# Patient Record
Sex: MALE | Race: WHITE | Employment: FULL TIME | URBAN - METROPOLITAN AREA
[De-identification: names, ages, dates, MRNs, and addresses within clinical notes are randomized per-mention and may not be internally consistent; named-entity substitution may affect disease eponyms.]

---

## 2018-09-27 ENCOUNTER — HOSPITAL ENCOUNTER (OUTPATIENT)
Dept: OCCUPATIONAL MEDICINE | Age: 38
Discharge: HOME | End: 2018-09-27

## 2018-09-27 DIAGNOSIS — R52 PAIN: ICD-10-CM

## 2019-08-30 ENCOUNTER — OFFICE VISIT (OUTPATIENT)
Dept: INTERNAL MEDICINE CLINIC | Age: 39
End: 2019-08-30
Payer: COMMERCIAL

## 2019-08-30 VITALS
OXYGEN SATURATION: 98 % | DIASTOLIC BLOOD PRESSURE: 80 MMHG | SYSTOLIC BLOOD PRESSURE: 110 MMHG | WEIGHT: 275 LBS | HEIGHT: 72 IN | BODY MASS INDEX: 37.25 KG/M2 | HEART RATE: 103 BPM

## 2019-08-30 DIAGNOSIS — R05.3 CHRONIC COUGH: ICD-10-CM

## 2019-08-30 DIAGNOSIS — Z11.59 SCREENING FOR VIRAL DISEASE: ICD-10-CM

## 2019-08-30 DIAGNOSIS — Z13.220 SCREENING CHOLESTEROL LEVEL: ICD-10-CM

## 2019-08-30 DIAGNOSIS — Z13.1 SCREENING FOR DIABETES MELLITUS: ICD-10-CM

## 2019-08-30 DIAGNOSIS — Z00.00 ANNUAL PHYSICAL EXAM: Primary | ICD-10-CM

## 2019-08-30 DIAGNOSIS — R31.21 ASYMPTOMATIC MICROSCOPIC HEMATURIA: ICD-10-CM

## 2019-08-30 PROCEDURE — 99386 PREV VISIT NEW AGE 40-64: CPT | Performed by: FAMILY MEDICINE

## 2019-08-30 SDOH — HEALTH STABILITY: MENTAL HEALTH: HOW OFTEN DO YOU HAVE A DRINK CONTAINING ALCOHOL?: NEVER

## 2019-08-30 ASSESSMENT — PATIENT HEALTH QUESTIONNAIRE - PHQ9
SUM OF ALL RESPONSES TO PHQ QUESTIONS 1-9: 0
SUM OF ALL RESPONSES TO PHQ9 QUESTIONS 1 & 2: 0
SUM OF ALL RESPONSES TO PHQ QUESTIONS 1-9: 0
2. FEELING DOWN, DEPRESSED OR HOPELESS: 0
1. LITTLE INTEREST OR PLEASURE IN DOING THINGS: 0

## 2019-09-02 ASSESSMENT — ENCOUNTER SYMPTOMS
DIARRHEA: 0
RHINORRHEA: 0
NAUSEA: 0
COUGH: 1
ABDOMINAL PAIN: 0
WHEEZING: 0
SHORTNESS OF BREATH: 0
BLOOD IN STOOL: 0
SORE THROAT: 0
CONSTIPATION: 0
BACK PAIN: 0
SINUS PRESSURE: 0
EYES NEGATIVE: 1

## 2019-10-01 DIAGNOSIS — R31.21 ASYMPTOMATIC MICROSCOPIC HEMATURIA: Primary | ICD-10-CM

## 2019-10-18 ENCOUNTER — TELEPHONE (OUTPATIENT)
Dept: INTERNAL MEDICINE CLINIC | Age: 39
End: 2019-10-18

## 2019-10-18 DIAGNOSIS — R31.21 ASYMPTOMATIC MICROSCOPIC HEMATURIA: Primary | ICD-10-CM

## 2019-11-06 ENCOUNTER — TELEPHONE (OUTPATIENT)
Dept: INTERNAL MEDICINE CLINIC | Age: 39
End: 2019-11-06

## 2019-11-06 DIAGNOSIS — R31.29 OTHER MICROSCOPIC HEMATURIA: Primary | ICD-10-CM

## 2019-12-30 ENCOUNTER — TELEPHONE (OUTPATIENT)
Dept: INTERNAL MEDICINE CLINIC | Age: 39
End: 2019-12-30

## 2019-12-30 ENCOUNTER — HOSPITAL ENCOUNTER (OUTPATIENT)
Age: 39
Setting detail: SPECIMEN
Discharge: HOME | End: 2019-12-30
Payer: COMMERCIAL

## 2019-12-30 LAB
BACTERIA: ABNORMAL /HPF
BILIRUBIN URINE: NEGATIVE MG/DL
BLOOD, URINE: ABNORMAL
CLARITY: CLEAR
COLOR: YELLOW
GLUCOSE, URINE: NEGATIVE MG/DL
KETONES, URINE: NEGATIVE MG/DL
LEUKOCYTE ESTERASE, URINE: NEGATIVE
NITRITE URINE, QUANTITATIVE: NEGATIVE
PH, URINE: 6 (ref 5–8)
PROTEIN UA: NEGATIVE MG/DL
RBC URINE: 1 /HPF (ref 0–3)
SPECIFIC GRAVITY UA: 1.02 (ref 1–1.03)
SQUAMOUS EPITHELIAL: <1 /HPF
TRICHOMONAS: ABNORMAL /HPF
UROBILINOGEN, URINE: NORMAL MG/DL (ref 0.2–1)
WBC UA: 1 /HPF (ref 0–2)

## 2019-12-30 PROCEDURE — 81001 URINALYSIS AUTO W/SCOPE: CPT

## 2019-12-31 DIAGNOSIS — R31.29 OTHER MICROSCOPIC HEMATURIA: Primary | ICD-10-CM

## 2020-12-03 ENCOUNTER — TELEPHONE (OUTPATIENT)
Dept: INTERNAL MEDICINE CLINIC | Age: 40
End: 2020-12-03

## 2020-12-03 NOTE — TELEPHONE ENCOUNTER
Pt calling in stating he had tested positive for Covid and was sent home yesterday for a temp and still has no sense of smell. Advised pt to go get evaluated at urgent care or the ER.

## 2021-05-25 ENCOUNTER — OFFICE VISIT (OUTPATIENT)
Dept: INTERNAL MEDICINE CLINIC | Age: 41
End: 2021-05-25
Payer: COMMERCIAL

## 2021-05-25 VITALS
BODY MASS INDEX: 33.6 KG/M2 | OXYGEN SATURATION: 93 % | SYSTOLIC BLOOD PRESSURE: 120 MMHG | HEIGHT: 71 IN | WEIGHT: 240 LBS | TEMPERATURE: 97.3 F | DIASTOLIC BLOOD PRESSURE: 78 MMHG | HEART RATE: 88 BPM

## 2021-05-25 DIAGNOSIS — Z86.16 HISTORY OF COVID-19: ICD-10-CM

## 2021-05-25 DIAGNOSIS — Z13.220 SCREENING CHOLESTEROL LEVEL: ICD-10-CM

## 2021-05-25 DIAGNOSIS — Z13.1 SCREENING FOR DIABETES MELLITUS: ICD-10-CM

## 2021-05-25 DIAGNOSIS — R05.3 CHRONIC COUGH: ICD-10-CM

## 2021-05-25 DIAGNOSIS — Z00.00 ENCOUNTER FOR ANNUAL PHYSICAL EXAM: Primary | ICD-10-CM

## 2021-05-25 PROCEDURE — 99396 PREV VISIT EST AGE 40-64: CPT | Performed by: FAMILY MEDICINE

## 2021-05-25 RX ORDER — ALBUTEROL SULFATE 90 UG/1
2 AEROSOL, METERED RESPIRATORY (INHALATION) EVERY 6 HOURS PRN
Qty: 1 INHALER | Refills: 0 | Status: SHIPPED | OUTPATIENT
Start: 2021-05-25

## 2021-05-25 RX ORDER — CETIRIZINE HYDROCHLORIDE 5 MG/1
5 TABLET ORAL DAILY
COMMUNITY
End: 2022-10-28

## 2021-05-25 SDOH — ECONOMIC STABILITY: FOOD INSECURITY: WITHIN THE PAST 12 MONTHS, THE FOOD YOU BOUGHT JUST DIDN'T LAST AND YOU DIDN'T HAVE MONEY TO GET MORE.: NEVER TRUE

## 2021-05-25 ASSESSMENT — ENCOUNTER SYMPTOMS
DIARRHEA: 0
CONSTIPATION: 0
BACK PAIN: 0
ABDOMINAL PAIN: 0
NAUSEA: 0
EYES NEGATIVE: 1
BLOOD IN STOOL: 0
SHORTNESS OF BREATH: 0

## 2021-05-25 ASSESSMENT — PATIENT HEALTH QUESTIONNAIRE - PHQ9
SUM OF ALL RESPONSES TO PHQ QUESTIONS 1-9: 0

## 2021-05-25 ASSESSMENT — SOCIAL DETERMINANTS OF HEALTH (SDOH): HOW HARD IS IT FOR YOU TO PAY FOR THE VERY BASICS LIKE FOOD, HOUSING, MEDICAL CARE, AND HEATING?: NOT HARD AT ALL

## 2021-05-25 NOTE — PROGRESS NOTES
Placed This Encounter   Medications    albuterol sulfate  (90 Base) MCG/ACT inhaler     Sig: Inhale 2 puffs into the lungs every 6 hours as needed for Wheezing     Dispense:  1 Inhaler     Refill:  0   Per orders  Start Albuterol. Stop Primatene Mist  ADR's explained  Persist RTO or call         Return for Follow up as needed or annually.     Electronically Signed by Villa King DO

## 2021-05-29 ASSESSMENT — ENCOUNTER SYMPTOMS: COUGH: 1

## 2021-06-26 ENCOUNTER — HOSPITAL ENCOUNTER (OUTPATIENT)
Age: 41
Discharge: HOME | End: 2021-06-26
Payer: COMMERCIAL

## 2021-06-26 ENCOUNTER — HOSPITAL ENCOUNTER (OUTPATIENT)
Dept: GENERAL RADIOLOGY | Age: 41
Discharge: HOME | End: 2021-06-26
Payer: COMMERCIAL

## 2021-06-26 DIAGNOSIS — Z13.1 SCREENING FOR DIABETES MELLITUS: ICD-10-CM

## 2021-06-26 DIAGNOSIS — Z00.00 ENCOUNTER FOR ANNUAL PHYSICAL EXAM: ICD-10-CM

## 2021-06-26 DIAGNOSIS — Z86.16 HISTORY OF COVID-19: ICD-10-CM

## 2021-06-26 DIAGNOSIS — Z13.220 SCREENING CHOLESTEROL LEVEL: ICD-10-CM

## 2021-06-26 DIAGNOSIS — R05.3 CHRONIC COUGH: ICD-10-CM

## 2021-06-26 LAB
ALBUMIN SERPL-MCNC: 3.3 GM/DL (ref 3.4–5)
ALP BLD-CCNC: 83 IU/L (ref 40–128)
ALT SERPL-CCNC: 17 U/L (ref 10–40)
ANION GAP SERPL CALCULATED.3IONS-SCNC: 7 MMOL/L (ref 4–16)
AST SERPL-CCNC: 17 IU/L (ref 15–37)
BACTERIA: NEGATIVE /HPF
BASOPHILS ABSOLUTE: 0.1 K/CU MM
BASOPHILS RELATIVE PERCENT: 1 % (ref 0–1)
BILIRUB SERPL-MCNC: 1.2 MG/DL (ref 0–1)
BILIRUBIN URINE: NEGATIVE MG/DL
BLOOD, URINE: ABNORMAL
BUN BLDV-MCNC: 14 MG/DL (ref 6–23)
CALCIUM SERPL-MCNC: 9.3 MG/DL (ref 8.3–10.6)
CHLORIDE BLD-SCNC: 100 MMOL/L (ref 99–110)
CHOLESTEROL: 159 MG/DL
CLARITY: CLEAR
CO2: 25 MMOL/L (ref 21–32)
COLOR: YELLOW
CREAT SERPL-MCNC: 0.8 MG/DL (ref 0.9–1.3)
DIFFERENTIAL TYPE: ABNORMAL
EOSINOPHILS ABSOLUTE: 0.3 K/CU MM
EOSINOPHILS RELATIVE PERCENT: 5.6 % (ref 0–3)
ESTIMATED AVERAGE GLUCOSE: 128 MG/DL
GFR AFRICAN AMERICAN: >60 ML/MIN/1.73M2
GFR NON-AFRICAN AMERICAN: >60 ML/MIN/1.73M2
GLUCOSE BLD-MCNC: 84 MG/DL (ref 70–99)
GLUCOSE, URINE: NEGATIVE MG/DL
HBA1C MFR BLD: 6.1 % (ref 4.2–6.3)
HCT VFR BLD CALC: 52 % (ref 42–52)
HDLC SERPL-MCNC: 36 MG/DL
HEMOGLOBIN: 17.3 GM/DL (ref 13.5–18)
IMMATURE NEUTROPHIL %: 0.2 % (ref 0–0.43)
KETONES, URINE: NEGATIVE MG/DL
LDL CHOLESTEROL DIRECT: 115 MG/DL
LEUKOCYTE ESTERASE, URINE: NEGATIVE
LYMPHOCYTES ABSOLUTE: 1.4 K/CU MM
LYMPHOCYTES RELATIVE PERCENT: 23.7 % (ref 24–44)
MCH RBC QN AUTO: 30.4 PG (ref 27–31)
MCHC RBC AUTO-ENTMCNC: 33.3 % (ref 32–36)
MCV RBC AUTO: 91.2 FL (ref 78–100)
MONOCYTES ABSOLUTE: 0.5 K/CU MM
MONOCYTES RELATIVE PERCENT: 9.1 % (ref 0–4)
NITRITE URINE, QUANTITATIVE: NEGATIVE
NUCLEATED RBC %: 0 %
PDW BLD-RTO: 12.8 % (ref 11.7–14.9)
PH, URINE: 6 (ref 5–8)
PLATELET # BLD: 311 K/CU MM (ref 140–440)
PMV BLD AUTO: 8.9 FL (ref 7.5–11.1)
POTASSIUM SERPL-SCNC: 4.4 MMOL/L (ref 3.5–5.1)
PROTEIN UA: NEGATIVE MG/DL
RBC # BLD: 5.7 M/CU MM (ref 4.6–6.2)
RBC URINE: <1 /HPF (ref 0–3)
SEGMENTED NEUTROPHILS ABSOLUTE COUNT: 3.5 K/CU MM
SEGMENTED NEUTROPHILS RELATIVE PERCENT: 60.4 % (ref 36–66)
SODIUM BLD-SCNC: 132 MMOL/L (ref 135–145)
SPECIFIC GRAVITY UA: 1.01 (ref 1–1.03)
SQUAMOUS EPITHELIAL: <1 /HPF
TOTAL IMMATURE NEUTOROPHIL: 0.01 K/CU MM
TOTAL NUCLEATED RBC: 0 K/CU MM
TOTAL PROTEIN: 10.2 GM/DL (ref 6.4–8.2)
TRICHOMONAS: ABNORMAL /HPF
TRIGL SERPL-MCNC: 75 MG/DL
UROBILINOGEN, URINE: NEGATIVE MG/DL (ref 0.2–1)
WBC # BLD: 5.7 K/CU MM (ref 4–10.5)
WBC UA: <1 /HPF (ref 0–2)

## 2021-06-26 PROCEDURE — 71046 X-RAY EXAM CHEST 2 VIEWS: CPT

## 2021-06-26 PROCEDURE — 80061 LIPID PANEL: CPT

## 2021-06-26 PROCEDURE — 85025 COMPLETE CBC W/AUTO DIFF WBC: CPT

## 2021-06-26 PROCEDURE — 36415 COLL VENOUS BLD VENIPUNCTURE: CPT

## 2021-06-26 PROCEDURE — 80053 COMPREHEN METABOLIC PANEL: CPT

## 2021-06-26 PROCEDURE — 83036 HEMOGLOBIN GLYCOSYLATED A1C: CPT

## 2021-06-26 PROCEDURE — 81001 URINALYSIS AUTO W/SCOPE: CPT

## 2021-06-26 PROCEDURE — 83721 ASSAY OF BLOOD LIPOPROTEIN: CPT

## 2021-06-29 ENCOUNTER — TELEPHONE (OUTPATIENT)
Dept: INTERNAL MEDICINE CLINIC | Age: 41
End: 2021-06-29

## 2021-06-29 DIAGNOSIS — R05.3 CHRONIC COUGH: ICD-10-CM

## 2021-06-29 DIAGNOSIS — R93.89 ABNORMAL CHEST X-RAY: Primary | ICD-10-CM

## 2021-07-01 ENCOUNTER — TELEPHONE (OUTPATIENT)
Dept: INTERNAL MEDICINE CLINIC | Age: 41
End: 2021-07-01

## 2021-07-01 DIAGNOSIS — E87.1 HYPONATREMIA: ICD-10-CM

## 2021-07-01 DIAGNOSIS — R17 ELEVATED BILIRUBIN: ICD-10-CM

## 2021-07-01 DIAGNOSIS — R77.9 ELEVATED BLOOD PROTEIN: Primary | ICD-10-CM

## 2021-07-01 NOTE — TELEPHONE ENCOUNTER
He can do the lab the same day he goes to the appt. They are ordered in the system. He probably can not see it. He already had the Creatinine done.  I ordered some additional labs to follow up on some of the recent abnormal labs

## 2021-07-01 NOTE — TELEPHONE ENCOUNTER
My chart message  I've got my CT scan scheduled. What do I need to do about the total serum protein test I'm told needs to be done before the scan? Serum creatnine?

## 2021-07-06 ENCOUNTER — HOSPITAL ENCOUNTER (OUTPATIENT)
Age: 41
Discharge: HOME | End: 2021-07-06
Payer: COMMERCIAL

## 2021-07-06 ENCOUNTER — HOSPITAL ENCOUNTER (OUTPATIENT)
Dept: CT IMAGING | Age: 41
Discharge: HOME | End: 2021-07-06
Payer: COMMERCIAL

## 2021-07-06 DIAGNOSIS — R05.3 CHRONIC COUGH: ICD-10-CM

## 2021-07-06 DIAGNOSIS — R93.89 ABNORMAL CHEST X-RAY: ICD-10-CM

## 2021-07-06 LAB
ANION GAP SERPL CALCULATED.3IONS-SCNC: 4 MMOL/L (ref 4–16)
BILIRUB SERPL-MCNC: 0.8 MG/DL (ref 0–1)
BILIRUBIN DIRECT: 0.2 MG/DL (ref 0–0.3)
CHLORIDE BLD-SCNC: 100 MMOL/L (ref 99–110)
CO2: 28 MMOL/L (ref 21–32)
POTASSIUM SERPL-SCNC: 4.4 MMOL/L (ref 3.5–5.1)
SODIUM BLD-SCNC: 132 MMOL/L (ref 135–145)

## 2021-07-06 PROCEDURE — 82247 BILIRUBIN TOTAL: CPT

## 2021-07-06 PROCEDURE — 86320 SERUM IMMUNOELECTROPHORESIS: CPT

## 2021-07-06 PROCEDURE — 36415 COLL VENOUS BLD VENIPUNCTURE: CPT

## 2021-07-06 PROCEDURE — 84165 PROTEIN E-PHORESIS SERUM: CPT

## 2021-07-06 PROCEDURE — 71250 CT THORAX DX C-: CPT

## 2021-07-06 PROCEDURE — 82248 BILIRUBIN DIRECT: CPT

## 2021-07-06 PROCEDURE — 80051 ELECTROLYTE PANEL: CPT

## 2021-07-07 RX ORDER — PREDNISONE 10 MG/1
TABLET ORAL
Qty: 18 TABLET | Refills: 0 | Status: SHIPPED | OUTPATIENT
Start: 2021-07-07 | End: 2021-07-21 | Stop reason: ALTCHOICE

## 2021-07-09 ENCOUNTER — PATIENT MESSAGE (OUTPATIENT)
Dept: INTERNAL MEDICINE CLINIC | Age: 41
End: 2021-07-09

## 2021-07-09 LAB
ALBUMIN ELP: 3.2 GM/DL (ref 3.2–5.6)
ALPHA-1-GLOBULIN: 0.2 GM/DL (ref 0.1–0.4)
ALPHA-2-GLOBULIN: 0.7 GM/DL (ref 0.4–1.2)
BETA GLOBULIN: 1 GM/DL (ref 0.5–1.3)
GAMMA GLOBULIN: 5.1 GM/DL (ref 0.5–1.6)
SPEP INTERPRETATION: ABNORMAL
SPEP INTERPRETATION: NORMAL
TOTAL PROTEIN: 10.2 GM/DL (ref 6.4–8.2)

## 2021-07-09 NOTE — TELEPHONE ENCOUNTER
From: Syeda Baeza  To: Mehul Torres DO  Sent: 7/9/2021 12:14 PM EDT  Subject: Non-Urgent Medical Question    I have an appointment scheduled with Dr Riley Toure 7/21/21 at 2:45.  He didn't need a referral.

## 2021-07-27 ENCOUNTER — HOSPITAL ENCOUNTER (OUTPATIENT)
Dept: CARDIAC REHAB | Age: 41
Discharge: HOME | End: 2021-07-27
Payer: COMMERCIAL

## 2021-07-27 LAB
SARS-COV-2, NAAT: NOT DETECTED
SOURCE: NORMAL

## 2021-07-27 PROCEDURE — 94727 GAS DIL/WSHOT DETER LNG VOL: CPT

## 2021-07-27 PROCEDURE — 94060 EVALUATION OF WHEEZING: CPT

## 2021-07-27 PROCEDURE — 94640 AIRWAY INHALATION TREATMENT: CPT

## 2021-07-27 PROCEDURE — 87635 SARS-COV-2 COVID-19 AMP PRB: CPT

## 2021-07-27 PROCEDURE — 94729 DIFFUSING CAPACITY: CPT

## 2021-08-27 ENCOUNTER — TELEPHONE (OUTPATIENT)
Dept: INTERNAL MEDICINE CLINIC | Age: 41
End: 2021-08-27

## 2021-08-31 NOTE — TELEPHONE ENCOUNTER
Spoke to pt last week after visits to pulmonology. He has lung nodules and hypersensitivity pneumonitis. He was referred to [facility]. Prednisone can help. He states his weight is stable.  He will follow up with [facility]

## 2022-05-27 ENCOUNTER — OFFICE VISIT (OUTPATIENT)
Dept: INTERNAL MEDICINE CLINIC | Age: 42
End: 2022-05-27
Payer: COMMERCIAL

## 2022-05-27 VITALS
OXYGEN SATURATION: 96 % | DIASTOLIC BLOOD PRESSURE: 74 MMHG | HEIGHT: 72 IN | WEIGHT: 250 LBS | BODY MASS INDEX: 33.86 KG/M2 | HEART RATE: 89 BPM | SYSTOLIC BLOOD PRESSURE: 120 MMHG

## 2022-05-27 DIAGNOSIS — J67.9 HYPERSENSITIVITY PNEUMONITIS (HCC): ICD-10-CM

## 2022-05-27 DIAGNOSIS — R73.03 PREDIABETES: ICD-10-CM

## 2022-05-27 DIAGNOSIS — E78.5 DYSLIPIDEMIA: ICD-10-CM

## 2022-05-27 DIAGNOSIS — R22.42 LUMP OF THIGH, LEFT: ICD-10-CM

## 2022-05-27 DIAGNOSIS — Z00.00 ANNUAL PHYSICAL EXAM: Primary | ICD-10-CM

## 2022-05-27 PROCEDURE — 99396 PREV VISIT EST AGE 40-64: CPT | Performed by: FAMILY MEDICINE

## 2022-05-27 SDOH — ECONOMIC STABILITY: FOOD INSECURITY: WITHIN THE PAST 12 MONTHS, YOU WORRIED THAT YOUR FOOD WOULD RUN OUT BEFORE YOU GOT MONEY TO BUY MORE.: NEVER TRUE

## 2022-05-27 SDOH — ECONOMIC STABILITY: FOOD INSECURITY: WITHIN THE PAST 12 MONTHS, THE FOOD YOU BOUGHT JUST DIDN'T LAST AND YOU DIDN'T HAVE MONEY TO GET MORE.: NEVER TRUE

## 2022-05-27 ASSESSMENT — PATIENT HEALTH QUESTIONNAIRE - PHQ9
SUM OF ALL RESPONSES TO PHQ QUESTIONS 1-9: 0
SUM OF ALL RESPONSES TO PHQ QUESTIONS 1-9: 0
1. LITTLE INTEREST OR PLEASURE IN DOING THINGS: 0
2. FEELING DOWN, DEPRESSED OR HOPELESS: 0
SUM OF ALL RESPONSES TO PHQ QUESTIONS 1-9: 0
SUM OF ALL RESPONSES TO PHQ9 QUESTIONS 1 & 2: 0
SUM OF ALL RESPONSES TO PHQ QUESTIONS 1-9: 0

## 2022-05-27 ASSESSMENT — ENCOUNTER SYMPTOMS
COUGH: 1
NAUSEA: 0
CONSTIPATION: 0
BACK PAIN: 0
SHORTNESS OF BREATH: 0
BLOOD IN STOOL: 0
ABDOMINAL PAIN: 0
DIARRHEA: 0

## 2022-05-27 ASSESSMENT — SOCIAL DETERMINANTS OF HEALTH (SDOH): HOW HARD IS IT FOR YOU TO PAY FOR THE VERY BASICS LIKE FOOD, HOUSING, MEDICAL CARE, AND HEATING?: NOT HARD AT ALL

## 2022-05-27 NOTE — PROGRESS NOTES
Well Adult Note  Name: Bryan Garg Date: 2022   MRN: [id number] Sex: Male   Age: 35 y.o. Ethnicity: Non- / Non    : 1980 Race: White (non-)      Prashant Ogden is here for well adult exam.  History:  Patient Active Problem List    Diagnosis Date Noted    Prediabetes 2022    Dyslipidemia 2022    Hypersensitivity pneumonia ([address] 2022     Pt was seen at [facility] and he had an evaluation for hypersensitivity pneumonitis with bronchoscopy. He will follow up for repeat CT of the chest. He can still occasionally cough   He c/o of a chronic lump to anterior lower L thigh. It can appear bruised over the surface. He has noticed this for a long time  +Wt gain      Review of Systems   Constitutional: Negative for chills, diaphoresis and fever. HENT: Negative. Eyes: Negative for visual disturbance. Respiratory: Positive for cough (occasional). Negative for shortness of breath. Cardiovascular: Negative for chest pain and palpitations. Varicose veins   Gastrointestinal: Negative for abdominal pain, blood in stool, constipation, diarrhea and nausea. Genitourinary: Negative for difficulty urinating and dysuria. Musculoskeletal: Negative for back pain. Neurological: Negative for dizziness, light-headedness and headaches. Psychiatric/Behavioral: Negative for dysphoric mood. The 10-year ASCVD risk score (Elli Melara, et al., 2013) is: 1.5%    Values used to calculate the score:      Age: 35 years      Sex: Male      Is Non- : No      Diabetic: No      Tobacco smoker: No      Systolic Blood Pressure: 428 mmHg      Is BP treated: No      HDL Cholesterol: 36 MG/DL      Total Cholesterol: 159 MG/DL    Allergies   Allergen Reactions    Latex     Penicillins          Prior to Visit Medications    Medication Sig Taking?  Authorizing Provider   cetirizine (ZYRTEC) 5 MG tablet Take 5 mg by mouth daily Yes Historical Provider, MD albuterol sulfate  (90 Base) MCG/ACT inhaler Inhale 2 puffs into the lungs every 6 hours as needed for Wheezing Yes Dot Lands, DO         Past Medical History:   Diagnosis Date    GERD (gastroesophageal reflux disease)     History of concussion     Teenager       Past Surgical History:   Procedure Laterality Date    WISDOM TOOTH EXTRACTION           Family History   Problem Relation Age of Onset    Seizures Mother    Vinayak Jauregui Rheum Arthritis Mother     Coronary Art Dis Father     Diabetes Father     COPD Father        Social History     Tobacco Use    Smoking status: Never Smoker    Smokeless tobacco: Never Used   Substance Use Topics    Alcohol use: Never    Drug use: Never       Objective   /74 (Site: Right Upper Arm, Position: Sitting, Cuff Size: Medium Adult)   Pulse 89   Ht 6' (1.829 m)   Wt 250 lb (113.4 kg)   SpO2 96%   BMI 33.91 kg/m²   Wt Readings from Last 3 Encounters:   05/27/22 250 lb (113.4 kg)   08/04/21 235 lb (106.6 kg)   07/21/21 235 lb (106.6 kg)     There were no vitals filed for this visit. Physical Exam  Vitals reviewed. Constitutional:       General: He is not in acute distress. HENT:      Right Ear: Tympanic membrane normal.      Left Ear: Tympanic membrane normal.   Eyes:      General: No scleral icterus. Extraocular Movements: Extraocular movements intact. Cardiovascular:      Rate and Rhythm: Normal rate and regular rhythm. Pulmonary:      Effort: Pulmonary effort is normal. No respiratory distress. Breath sounds: Normal breath sounds. Abdominal:      Palpations: Abdomen is soft. Tenderness: There is no abdominal tenderness. Musculoskeletal:      Cervical back: Neck supple. Right lower leg: No edema. Left lower leg: No edema. Skin:     Findings: No rash. Comments: L lower thigh with lump and bruising   Neurological:      Mental Status: He is alert and oriented to person, place, and time. Mental status is at baseline. Psychiatric:         Mood and Affect: Mood normal.           Assessment   Plan   1. Annual physical exam  -     CBC with Auto Differential; Future  -     Comprehensive Metabolic Panel; Future  -     Lipid Panel; Future  -     Hemoglobin A1C; Future  2. Prediabetes  -     Hemoglobin A1C; Future  The patient is asked to make an attempt to improve diet and exercise patterns  3. Dyslipidemia  -     Lipid Panel; Future  -     TSH with Reflex to FT4; Future  4. Hypersensitivity pneumonitis ([address]- improved  Patient will follow up with [facility]  5. Lump of thigh, left- chronic  -     US SOFT TISSUE LIMITED AREA; Future       Personalized Preventive Plan   Current Health Maintenance Status    There is no immunization history on file for this patient.      Health Maintenance   Topic Date Due    COVID-19 Vaccine (1) Never done    A1C test (Diabetic or Prediabetic)  06/26/2022    DTaP/Tdap/Td vaccine (1 - Tdap) 05/27/2023 (Originally 11/20/1997)    Hepatitis C screen  05/27/2023 (Originally 11/20/1996)    HIV screen  05/27/2023 (Originally 11/20/1993)    Flu vaccine (Season Ended) 09/01/2022    Depression Screen  05/27/2023    Lipids  06/26/2026    Hepatitis A vaccine  Aged Out    Hepatitis B vaccine  Aged Out    Hib vaccine  Aged Out    Meningococcal (ACWY) vaccine  Aged Out    Pneumococcal 0-64 years Vaccine  Aged Out     Recommendations for [facility] Due: see orders and patient instructions/AVS.  Persist RTO or call    Return in about 1 year (around 5/27/2023) for annual exam.

## 2022-06-14 ENCOUNTER — HOSPITAL ENCOUNTER (OUTPATIENT)
Dept: ULTRASOUND IMAGING | Age: 42
Discharge: HOME | End: 2022-06-14
Payer: COMMERCIAL

## 2022-06-14 DIAGNOSIS — R22.42 LUMP OF THIGH, LEFT: ICD-10-CM

## 2022-06-14 DIAGNOSIS — R22.42 LUMP OF LEFT THIGH: ICD-10-CM

## 2022-06-14 PROCEDURE — 76882 US LMTD JT/FCL EVL NVASC XTR: CPT

## 2022-06-15 ENCOUNTER — TELEPHONE (OUTPATIENT)
Dept: INTERNAL MEDICINE CLINIC | Age: 42
End: 2022-06-15

## 2022-06-15 DIAGNOSIS — R22.42 MASS OF LEFT THIGH: Primary | ICD-10-CM

## 2022-06-15 NOTE — TELEPHONE ENCOUNTER
Ashley Colindres from [facility] called regarding nonvascular ultrasound of the left extremity regarding the impression please review ASAP
Spoke to patient.  MRI ordered
Tdap , 2016/3/9 00:30 , Roxie Goncalves (RN)  Tdap , 2019/4/18 09:55 , Rosalina Conti (RN)

## 2022-06-16 ENCOUNTER — TELEPHONE (OUTPATIENT)
Dept: INTERNAL MEDICINE CLINIC | Age: 42
End: 2022-06-16

## 2022-06-16 DIAGNOSIS — Z01.818 PREPROCEDURAL EXAMINATION: Primary | ICD-10-CM

## 2022-06-16 NOTE — TELEPHONE ENCOUNTER
----- Message from [facility] sent at 6/16/2022  2:50 PM EDT -----  Subject: Referral Request    QUESTIONS   Reason for referral request? Vickey Mason from central scheduling called and is   asking for pcp to put in order for an orbital x-ray. He will need it done   before MRI on 7/1. Has the physician seen you for this condition before? No   Preferred Specialist (if applicable)? Do you already have an appointment scheduled? Yes  Select Scheduled Date? 2022-07-01  Select Scheduled Physician? Additional Information for Provider?   ---------------------------------------------------------------------------  --------------  CALL BACK INFO  What is the best way for the office to contact you? OK to leave message on   voicemail  Preferred Call Back Phone Number? [phone]  ---------------------------------------------------------------------------  --------------  SCRIPT ANSWERS  Relationship to Patient? Third Party  Third Party Type? Hospital?   Representative Name?  Vickey Mason

## 2022-06-16 NOTE — TELEPHONE ENCOUNTER
Pt needs an orbital xray order to be done prior to his 7/1/22 MRI per [facility].

## 2022-06-17 NOTE — TELEPHONE ENCOUNTER
Called [facility] and spoke with Cheyanne Diehl and informed her that the Orbital xray has been ordered.

## 2022-06-29 ENCOUNTER — HOSPITAL ENCOUNTER (OUTPATIENT)
Dept: GENERAL RADIOLOGY | Age: 42
Discharge: HOME | End: 2022-06-29
Payer: COMMERCIAL

## 2022-06-29 ENCOUNTER — HOSPITAL ENCOUNTER (OUTPATIENT)
Age: 42
Discharge: HOME | End: 2022-06-29
Payer: COMMERCIAL

## 2022-06-29 DIAGNOSIS — Z01.818 PREPROCEDURAL EXAMINATION: ICD-10-CM

## 2022-06-29 PROCEDURE — 70200 X-RAY EXAM OF EYE SOCKETS: CPT

## 2022-07-01 ENCOUNTER — HOSPITAL ENCOUNTER (OUTPATIENT)
Dept: MRI IMAGING | Age: 42
Discharge: HOME | End: 2022-07-01
Payer: COMMERCIAL

## 2022-07-01 DIAGNOSIS — R22.42 MASS OF LEFT THIGH: ICD-10-CM

## 2022-07-01 PROCEDURE — 73718 MRI LOWER EXTREMITY W/O DYE: CPT

## 2022-07-02 LAB
ALBUMIN SERPL-MCNC: 3.9 G/DL
ALP BLD-CCNC: 82 U/L
ALT SERPL-CCNC: 33 U/L
ANION GAP SERPL CALCULATED.3IONS-SCNC: 0.7 MMOL/L
AST SERPL-CCNC: 21 U/L
AVERAGE GLUCOSE: NORMAL
BASOPHILS ABSOLUTE: 0.1 /ΜL
BASOPHILS RELATIVE PERCENT: 0.8 %
BILIRUB SERPL-MCNC: 1 MG/DL (ref 0.1–1.4)
BUN BLDV-MCNC: 18 MG/DL
CALCIUM SERPL-MCNC: 9.6 MG/DL
CHLORIDE BLD-SCNC: 101 MMOL/L
CHOLESTEROL, TOTAL: 161 MG/DL
CHOLESTEROL/HDL RATIO: NORMAL
CO2: 27 MMOL/L
CREAT SERPL-MCNC: 0.9 MG/DL
EOSINOPHILS ABSOLUTE: 0.5 /ΜL
EOSINOPHILS RELATIVE PERCENT: 7.5 %
GFR CALCULATED: 109
GLUCOSE BLD-MCNC: 87 MG/DL
HBA1C MFR BLD: 5.4 %
HCT VFR BLD CALC: 47.6 % (ref 41–53)
HDLC SERPL-MCNC: 36 MG/DL (ref 35–70)
HEMOGLOBIN: 16.4 G/DL (ref 13.5–17.5)
LDL CHOLESTEROL CALCULATED: 100 MG/DL (ref 0–160)
LYMPHOCYTES ABSOLUTE: 1.7 /ΜL
LYMPHOCYTES RELATIVE PERCENT: 29 %
MCH RBC QN AUTO: 31.1 PG
MCHC RBC AUTO-ENTMCNC: 34.5 G/DL
MCV RBC AUTO: 90.3 FL
MONOCYTES ABSOLUTE: 0.6 /ΜL
MONOCYTES RELATIVE PERCENT: 9.5 %
NEUTROPHILS ABSOLUTE: 3.2 /ΜL
NEUTROPHILS RELATIVE PERCENT: 53.2 %
NONHDLC SERPL-MCNC: NORMAL MG/DL
PDW BLD-RTO: 12.3 %
PLATELET # BLD: 260 K/ΜL
PMV BLD AUTO: 9.6 FL
POTASSIUM SERPL-SCNC: 4.4 MMOL/L
RBC # BLD: 5.27 10^6/ΜL
SODIUM BLD-SCNC: 133 MMOL/L
TOTAL PROTEIN: 9.3
TRIGL SERPL-MCNC: 127 MG/DL
TSH SERPL DL<=0.05 MIU/L-ACNC: 2.03 UIU/ML
VLDLC SERPL CALC-MCNC: 25 MG/DL
WBC # BLD: 6 10^3/ML

## 2022-07-18 ENCOUNTER — TELEPHONE (OUTPATIENT)
Dept: INTERNAL MEDICINE CLINIC | Age: 42
End: 2022-07-18

## 2022-07-18 DIAGNOSIS — R77.9 ELEVATED BLOOD PROTEIN: ICD-10-CM

## 2022-07-18 DIAGNOSIS — J67.9 HYPERSENSITIVITY PNEUMONITIS (HCC): ICD-10-CM

## 2022-07-18 DIAGNOSIS — Z11.59 NEED FOR HEPATITIS C SCREENING TEST: ICD-10-CM

## 2022-07-18 DIAGNOSIS — Z11.4 SCREENING FOR HIV (HUMAN IMMUNODEFICIENCY VIRUS): Primary | ICD-10-CM

## 2022-08-27 LAB
ANTIBODY: NORMAL
C-REACTIVE PROTEIN: 0.4
HIV AG/AB: NORMAL
IGA: 310
IGE: 77
IGG: 4252
IGM: 225

## 2022-10-04 ENCOUNTER — TELEPHONE (OUTPATIENT)
Dept: INTERNAL MEDICINE CLINIC | Age: 42
End: 2022-10-04

## 2022-10-04 DIAGNOSIS — R76.8 INCREASED IMMUNOGLOBULIN: Primary | ICD-10-CM

## 2022-10-05 NOTE — TELEPHONE ENCOUNTER
I was able to reach patient to discuss labs and his symptoms.  Referred to hematology for additional evaluation

## 2022-10-07 DIAGNOSIS — Z11.59 NEED FOR HEPATITIS C SCREENING TEST: ICD-10-CM

## 2022-10-07 DIAGNOSIS — R77.9 ELEVATED BLOOD PROTEIN: ICD-10-CM

## 2022-10-07 DIAGNOSIS — Z11.4 SCREENING FOR HIV (HUMAN IMMUNODEFICIENCY VIRUS): ICD-10-CM

## 2022-10-07 DIAGNOSIS — J67.9 HYPERSENSITIVITY PNEUMONITIS (HCC): ICD-10-CM

## 2022-10-10 NOTE — PROGRESS NOTES
Patient Name:  Saskia Edmonds  Patient :  1980  Patient MRN:  [id number]     Primary Oncologist: Chirag Theodore MD  Referring Provider: Maci Bonds DO     Date of Service: 10/28/2022      Reason for Consult:  increased immunoglobulin      Chief Complaint:    Chief Complaint   Patient presents with    New Patient        Patient Active Problem List:     Prediabetes     Dyslipidemia     Hypersensitivity pneumonia ([address]     HPI:   Saskia Edmonds is a pleasant 35year old male patient who was referred for evaluation of increase immunoglobulin. He saw pulmonologist at [facility] for post COVID syndrome and  nonspecific hypersensitivity pneumonitis 2021. He was treated with prednisone for 3 - 4 weeks with improvement of the symptoms. He has BAL for RML in 2021. He had COVID in . FINAL DIAGNOSIS:  ? No Malignant Cells Are Identified  ? Alveolar Macrophages  ? Acute Inflammation, Few Eosinophils    CT chest at [facility] 2021:   The groundglass attenuation seen previously has improved. The mosaic pattern of attenuation, most prominent in the lung bases, felt to represent air trapping when correlated the prior study that included expiratory imaging, remains. Certainly subacute hypersensitivity pneumonitis could have this appearance, and imaging suggests some interval improvement. 2021 SPEP: no monoclonal bands. Increased polyclonal gamma globulins. 2022 CMP, TSH and CBC unremarkable. CRP 2022 0.4. HIV negative. IgA 310. IgE 77, IgG 4252, IgM 225. Hep C Ab screening negative. I will order CBC, CMP, LDH, SPEP/CHELSY, serum light chain study, JEFF, RF and US of abdomen.   If these remain inconclusive I will order labs as below,   JEFF, complement C3, complement C4, and ARI  Hepatitis B virus, hepatitis C virus, Will-Barr virus, human herpesvirus-8 serologies  Imaging for liver disease, lymphadenopathy, and malignancy  Bone marrow biopsy if lymphoma or hematological disorder suspected  lgG subclasses  Cytokine profile (IL-5, IL-6, and soluble IL-2 receptor)  Genetic testing for immunodeficiency syndromes    Past Medical History:   Diagnosis Date    GERD (gastroesophageal reflux disease)     History of concussion     Teenager     Past Surgical History:   Procedure Laterality Date    WISDOM TOOTH EXTRACTION       Social History:   No smoking, No EtOH. He has no biological children. Family History:   Paternal GM has lung cancer. Maternal GM has metastatic breast cancer. Allergies   Allergen Reactions    Latex     Penicillins      Current Outpatient Medications on File Prior to Visit   Medication Sig Dispense Refill    Multiple Vitamin (MULTIVITAMIN) capsule Take 1 capsule by mouth daily      loratadine (CLARITIN) 10 MG tablet Take 10 mg by mouth daily      albuterol sulfate  (90 Base) MCG/ACT inhaler Inhale 2 puffs into the lungs every 6 hours as needed for Wheezing 1 Inhaler 0     No current facility-administered medications on file prior to visit. Review of Systems:    Constitutional:  No weight loss, No fever, No chills, No night sweats. Energy level good. Eyes:  No diplopia, No transient or permanent loss of vision, No scotomata. ENT / Mouth:  No epistaxis, No dysphagia, No hoarseness, No oral ulcers, No gingival bleeding. No sore throat, No postnasal drip, No nasal drip, No mouth pain, No sinus pain, No tinnitus, Normal hearing. Cardiovascular:  No chest pain, No palpitations, No syncope, No upper extremity edema, No lower extremity edema, No calf discomfort. Respiratory:  No cough. No hemoptysis, No pleurisy, No wheezing, No dyspnea. Gastrointestinal:  No abdominal pain, No abdominal cramping, No nausea, No vomiting, No constipation, No diarrhea, No hematochezia, No melena, No jaundice, No dyspepsia, No dysphagia. Urinary:  No dysuria, No hematuria, No urinary incontinence.   Musculoskeletal:  No muscle pain, No swollen joints, No joint redness, No bone pain, No spine tenderness. Skin:  No rash, No nodules, No pruritus, No lesions. Neurologic:  No confusion, No seizures, No syncope, No tremor, No speech change, No headache, No hiccups, No abnormal gait, No sensory changes, No weakness. Psychiatric:  No depression, No anxiety, Concentration normal.  Endocrine:  No polyuria, No polydipsia, No hot flashes, No thyroid symptoms. Hematologic:  No epistaxis, No gingival bleeding, No petechiae, No ecchymosis. Lymphatic:  No lymphadenopathy, No lymphedema. Allergy / Immunologic:  No eczema, No frequent mucous infections, No frequent respiratory infections, No recurrent urticarial, No frequent skin infections. Vital Signs: /78 (Site: Right Upper Arm, Position: Sitting, Cuff Size: Large Adult)   Pulse 96   Temp 98.3 °F (36.8 °C) (Temporal)   Resp 18   Ht 5' 10\" (1.778 m)   Wt 260 lb 3.2 oz (118 kg)   SpO2 92%   BMI 37.33 kg/m²      Physical Exam:  CONSTITUTIONAL: awake, alert, cooperative, no apparent distress   EYES: pupils equal, round and reactive to light, sclera clear and conjunctiva normal  ENT: Normocephalic, without obvious abnormality, atraumatic  NECK: supple, symmetrical, no jugular venous distension and no carotid bruits   HEMATOLOGIC/LYMPHATIC: no cervical, supraclavicular or axillary lymphadenopathy   LUNGS: no increased work of breathing and clear to auscultation   CARDIOVASCULAR: regular rate and rhythm, normal S1 and S2, no murmur noted  ABDOMEN: normal bowel sounds x 4, soft, non-distended, non-tender, no masses palpated, no hepatosplenomegaly   MUSCULOSKELETAL: full range of motion noted, tone is normal  NEUROLOGIC: awake, alert, oriented to name, place and time. Motor skills grossly intact. SKIN: Normal skin color, texture, turgor and no jaundice.  appears intact   EXTREMITIES: no LE edema      Labs:  Hematology:  Lab Results   Component Value Date    WBC 9.1 10/28/2022    RBC 5.07 10/28/2022    HGB 15.9 10/28/2022    HCT 46.5 10/28/2022    MCV 91.7 10/28/2022    MCH 31.4 (H) 10/28/2022    MCHC 34.2 10/28/2022    RDW 12.7 10/28/2022     10/28/2022    MPV 8.6 10/28/2022    SEGSPCT 69.2 (H) 10/28/2022    EOSRELPCT 5.7 (H) 10/28/2022    BASOPCT 0.5 10/28/2022    LYMPHOPCT 17.3 (L) 10/28/2022    MONOPCT 7.3 (H) 10/28/2022    SEGSABS 6.3 10/28/2022    EOSABS 0.5 10/28/2022    BASOSABS 0.1 10/28/2022    LYMPHSABS 1.6 10/28/2022    MONOSABS 0.7 10/28/2022    DIFFTYPE AUTOMATED DIFFERENTIAL 10/28/2022     Lab Results   Component Value Date    ESR 58 (H) 10/28/2022     Chemistry:  Lab Results   Component Value Date     (L) 10/28/2022    K 4.6 10/28/2022    CL 98 (L) 10/28/2022    CO2 25 10/28/2022    BUN 20 10/28/2022    CREATININE 0.9 10/28/2022    GLUCOSE 84 10/28/2022    CALCIUM 9.4 10/28/2022    PROT 9.4 (H) 10/28/2022    PROT 9.4 (H) 10/28/2022    LABALBU 3.8 10/28/2022    BILITOT 0.6 10/28/2022    ALKPHOS 85 10/28/2022    AST 20 10/28/2022    ALT 23 10/28/2022    LABGLOM >60 10/28/2022    GFRAA >60 06/26/2021     Immunology:  Lab Results   Component Value Date    PROT 9.4 (H) 10/28/2022    PROT 9.4 (H) 10/28/2022    SPEP  07/06/2021     INTERPRETATION - Increased polyclonal gamma globulins. No monoclonal proteins are seen. SAF    SPEP INTERPRETATION - No monoclonal bands are seen. SAF 07/06/2021    ALBUMINELP 3.2 07/06/2021    LABALPH 0.2 07/06/2021    LABALPH 0.7 07/06/2021    LABBETA 1.0 07/06/2021    GAMGLOB 5.1 (H) 07/06/2021      Observations:  No data recorded     Assessment & Plan:  1. He was referred for evaluation of increased IgG. Currently asymptomatic. I ordered w/u as above. He has history of hypersensitivity pneumonitis, responding to steroid. I recommend to monitor for lymphadenopathy. 2. I recommend to keep colon cancer screening up to date. RTC in 3 - 4 weeks or sooner. All of the questions have been answered for today.     I have discussed the above stated plan with the patient and they verbalized understanding and agreed

## 2022-10-28 ENCOUNTER — INITIAL CONSULT (OUTPATIENT)
Dept: ONCOLOGY | Age: 42
End: 2022-10-28
Payer: COMMERCIAL

## 2022-10-28 ENCOUNTER — HOSPITAL ENCOUNTER (OUTPATIENT)
Dept: INFUSION THERAPY | Age: 42
Discharge: HOME | End: 2022-10-28
Payer: COMMERCIAL

## 2022-10-28 VITALS
HEIGHT: 70 IN | BODY MASS INDEX: 37.25 KG/M2 | HEART RATE: 96 BPM | OXYGEN SATURATION: 92 % | TEMPERATURE: 98.3 F | RESPIRATION RATE: 18 BRPM | DIASTOLIC BLOOD PRESSURE: 78 MMHG | WEIGHT: 260.2 LBS | SYSTOLIC BLOOD PRESSURE: 123 MMHG

## 2022-10-28 DIAGNOSIS — D89.2 HYPERGAMMAGLOBULINEMIA, UNSPECIFIED: ICD-10-CM

## 2022-10-28 DIAGNOSIS — D89.2 HYPERGAMMAGLOBULINEMIA, UNSPECIFIED: Primary | ICD-10-CM

## 2022-10-28 LAB
ALBUMIN SERPL-MCNC: 3.8 GM/DL (ref 3.4–5)
ALP BLD-CCNC: 85 IU/L (ref 40–129)
ALT SERPL-CCNC: 23 U/L (ref 10–40)
ANION GAP SERPL CALCULATED.3IONS-SCNC: 8 MMOL/L (ref 4–16)
AST SERPL-CCNC: 20 IU/L (ref 15–37)
BASOPHILS ABSOLUTE: 0.1 K/CU MM
BASOPHILS RELATIVE PERCENT: 0.5 % (ref 0–1)
BILIRUB SERPL-MCNC: 0.6 MG/DL (ref 0–1)
BUN BLDV-MCNC: 20 MG/DL (ref 6–23)
CALCIUM SERPL-MCNC: 9.4 MG/DL (ref 8.3–10.6)
CHLORIDE BLD-SCNC: 98 MMOL/L (ref 99–110)
CO2: 25 MMOL/L (ref 21–32)
CREAT SERPL-MCNC: 0.9 MG/DL (ref 0.9–1.3)
DIFFERENTIAL TYPE: ABNORMAL
EOSINOPHILS ABSOLUTE: 0.5 K/CU MM
EOSINOPHILS RELATIVE PERCENT: 5.7 % (ref 0–3)
ERYTHROCYTE SEDIMENTATION RATE: 58 MM/HR (ref 0–15)
GFR SERPL CREATININE-BSD FRML MDRD: >60 ML/MIN/1.73M2
GLUCOSE BLD-MCNC: 84 MG/DL (ref 70–99)
HCT VFR BLD CALC: 46.5 % (ref 42–52)
HEMOGLOBIN: 15.9 GM/DL (ref 13.5–18)
IGA: 298 MG/DL (ref 69–382)
IGG,SERUM: 4143 MG/DL (ref 723–1685)
IGM,SERUM: 248 MG/DL (ref 62–277)
LACTATE DEHYDROGENASE: 213 IU/L (ref 120–246)
LYMPHOCYTES ABSOLUTE: 1.6 K/CU MM
LYMPHOCYTES RELATIVE PERCENT: 17.3 % (ref 24–44)
MCH RBC QN AUTO: 31.4 PG (ref 27–31)
MCHC RBC AUTO-ENTMCNC: 34.2 % (ref 32–36)
MCV RBC AUTO: 91.7 FL (ref 78–100)
MONOCYTES ABSOLUTE: 0.7 K/CU MM
MONOCYTES RELATIVE PERCENT: 7.3 % (ref 0–4)
PDW BLD-RTO: 12.7 % (ref 11.7–14.9)
PLATELET # BLD: 285 K/CU MM (ref 140–440)
PMV BLD AUTO: 8.6 FL (ref 7.5–11.1)
POTASSIUM SERPL-SCNC: 4.6 MMOL/L (ref 3.5–5.1)
RBC # BLD: 5.07 M/CU MM (ref 4.6–6.2)
SEGMENTED NEUTROPHILS ABSOLUTE COUNT: 6.3 K/CU MM
SEGMENTED NEUTROPHILS RELATIVE PERCENT: 69.2 % (ref 36–66)
SODIUM BLD-SCNC: 131 MMOL/L (ref 135–145)
TOTAL PROTEIN: 9.4 GM/DL (ref 6.4–8.2)
WBC # BLD: 9.1 K/CU MM (ref 4–10.5)

## 2022-10-28 PROCEDURE — 99211 OFF/OP EST MAY X REQ PHY/QHP: CPT

## 2022-10-28 PROCEDURE — 85025 COMPLETE CBC W/AUTO DIFF WBC: CPT

## 2022-10-28 PROCEDURE — 83615 LACTATE (LD) (LDH) ENZYME: CPT

## 2022-10-28 PROCEDURE — 82232 ASSAY OF BETA-2 PROTEIN: CPT

## 2022-10-28 PROCEDURE — 36415 COLL VENOUS BLD VENIPUNCTURE: CPT

## 2022-10-28 PROCEDURE — 85652 RBC SED RATE AUTOMATED: CPT

## 2022-10-28 PROCEDURE — 99204 OFFICE O/P NEW MOD 45 MIN: CPT | Performed by: INTERNAL MEDICINE

## 2022-10-28 PROCEDURE — 80053 COMPREHEN METABOLIC PANEL: CPT

## 2022-10-28 PROCEDURE — 82784 ASSAY IGA/IGD/IGG/IGM EACH: CPT

## 2022-10-28 PROCEDURE — 86038 ANTINUCLEAR ANTIBODIES: CPT

## 2022-10-28 PROCEDURE — 86430 RHEUMATOID FACTOR TEST QUAL: CPT

## 2022-10-28 PROCEDURE — 86039 ANTINUCLEAR ANTIBODIES (ANA): CPT

## 2022-10-28 PROCEDURE — 86320 SERUM IMMUNOELECTROPHORESIS: CPT

## 2022-10-28 PROCEDURE — 83883 ASSAY NEPHELOMETRY NOT SPEC: CPT

## 2022-10-28 PROCEDURE — 84165 PROTEIN E-PHORESIS SERUM: CPT

## 2022-10-28 PROCEDURE — 84155 ASSAY OF PROTEIN SERUM: CPT

## 2022-10-28 RX ORDER — MULTIVITAMIN
1 CAPSULE ORAL DAILY
COMMUNITY

## 2022-10-28 RX ORDER — LORATADINE 10 MG/1
10 TABLET ORAL DAILY
COMMUNITY

## 2022-10-28 NOTE — PROGRESS NOTES
MA Rooming Questions  Patient: Radha Cooper  MRN: [id number]    Date: 10/28/2022      SCAR [place], IMAN

## 2022-10-30 LAB — BETA-2 MICROGLOBULIN: 2.6 MG/L (ref 0.8–2.4)

## 2022-10-31 LAB
ANTI-NUCLEAR ANTIBODY (ANA): DETECTED
KAPPA QUANT FREE LIGHT CHAINS: 70.72 MG/L (ref 3.3–19.4)
KAPPA/LAMBDA FREE LIGHT CHAIN RATIO: 1.26 (ref 0.26–1.65)
LAMBDA FREE LIGHT CHAINS QNT: 56.06 MG/L (ref 5.71–26.3)
RHEUMATOID FACTOR: 67 IU/ML (ref 0–14)

## 2022-11-01 ENCOUNTER — TELEPHONE (OUTPATIENT)
Dept: ONCOLOGY | Age: 42
End: 2022-11-01

## 2022-11-01 LAB
ANTINUCLEAR ANTIBODY, HEP-2, IGG: NORMAL
INTERPRETATION: NORMAL

## 2022-11-01 NOTE — TELEPHONE ENCOUNTER
Patient called given US abdomen time and prep to be done at [facility] on 11/4 arrival at 9 AM.

## 2022-11-04 ENCOUNTER — HOSPITAL ENCOUNTER (OUTPATIENT)
Dept: ULTRASOUND IMAGING | Age: 42
Discharge: HOME | End: 2022-11-04
Payer: COMMERCIAL

## 2022-11-04 DIAGNOSIS — D89.2 HYPERGAMMAGLOBULINEMIA, UNSPECIFIED: ICD-10-CM

## 2022-11-04 LAB
ALBUMIN ELP: 3.4 GM/DL (ref 3.2–5.6)
ALPHA-1-GLOBULIN: 0.3 GM/DL (ref 0.1–0.4)
ALPHA-2-GLOBULIN: 0.7 GM/DL (ref 0.4–1.2)
BETA GLOBULIN: 1.1 GM/DL (ref 0.5–1.3)
GAMMA GLOBULIN: 3.9 GM/DL (ref 0.5–1.6)
SPEP INTERPRETATION: ABNORMAL
TOTAL PROTEIN: 9.4 GM/DL (ref 6.4–8.2)

## 2022-11-04 PROCEDURE — 76775 US EXAM ABDO BACK WALL LIM: CPT

## 2022-11-04 PROCEDURE — 76705 ECHO EXAM OF ABDOMEN: CPT

## 2022-11-04 NOTE — PROGRESS NOTES
Patient Name:  Breanna Mcduffie  Patient :  1980  Patient MRN:  [id number]     Primary Oncologist: Luis Carlos Urena MD  Referring Provider: Daisy Salinas DO     Date of Service: 2022       Chief Complaint:    Chief Complaint   Patient presents with    Follow-up        He came in for follow up visit. Patient Active Problem List:     Prediabetes     Dyslipidemia     Hypersensitivity pneumonia (HCC)     HPI:   Breanna Mcduffie is a pleasant 38year old male patient who was referred for evaluation of increase immunoglobulin. He saw pulmonologist at [facility] for post COVID syndrome and  nonspecific hypersensitivity pneumonitis 2021. He was treated with prednisone for 3 - 4 weeks with improvement of the symptoms. He has BAL for RML in 2021. He had COVID in . FINAL DIAGNOSIS:  ? No Malignant Cells Are Identified  ? Alveolar Macrophages  ? Acute Inflammation, Few Eosinophils    CT chest at [facility] 2021:   The groundglass attenuation seen previously has improved. The mosaic pattern of attenuation, most prominent in the lung bases, felt to represent air trapping when correlated the prior study that included expiratory imaging, remains. Certainly subacute hypersensitivity pneumonitis could have this appearance, and imaging suggests some interval improvement. 2021 SPEP: no monoclonal bands. Increased polyclonal gamma globulins. 2022 CMP, TSH and CBC unremarkable. CRP 2022 0.4. HIV negative. IgA 310. IgE 77, IgG 4252, IgM 225. Hep C Ab screening negative. I will order CBC, CMP, LDH, SPEP/CHELSY, serum light chain study, JEFF, RF and US of abdomen.   If these remain inconclusive I will order labs as below,   JEFF, complement C3, complement C4, and ARI  Hepatitis B virus, hepatitis C virus, Will-Barr virus, human herpesvirus-8 serologies  Imaging for liver disease, lymphadenopathy, and malignancy  Bone marrow biopsy if lymphoma or hematological disorder suspected   lgG subclasses  Cytokine profile (IL-5, IL-6, and soluble IL-2 receptor)  Genetic testing for immunodeficiency syndromes    11/23/2022 he came in for follow up visit. 10/2022 total protein 9.4, , Albumin 3.8. CBC grossly unremarkable. Sed rate 58. IgG 4143H, JEFF detected. RF 67H. JEFF <1:80. Serum FLLC 56.06, [address] 70.72H, K/L ratio 1.26. SPEP no M-protein. I referred to rheumatologist.    No acute pain. Denied any nausea, vomiting or diarrhea. No fever or chills. No chest pain, shortness of breath or palpitation. No headache or dizzy spell. No specific bone pain. No melena or hematochezia. Denied any dysuria or hematuria. Past Medical History:   Diagnosis Date    GERD (gastroesophageal reflux disease)     History of concussion     Teenager     Past Surgical History:   Procedure Laterality Date    WISDOM TOOTH EXTRACTION       Social History:   No smoking, No EtOH. He has no biological children. Family History:   Paternal GM has lung cancer. Maternal GM has metastatic breast cancer. Review of Systems: The remainder of ROS is unremarkable. Vital Signs: /72 (Site: Right Upper Arm, Position: Sitting, Cuff Size: Large Adult)   Pulse (!) 116   Temp 98 °F (36.7 °C) (Temporal)   Resp 18   Ht 5' 10\" (1.778 m)   Wt 258 lb 3.2 oz (117.1 kg)   SpO2 94%   BMI 37.05 kg/m²      Physical Exam:  CONSTITUTIONAL: awake, alert, cooperative, no apparent distress   EYES: pupils equal, round and reactive to light.  Sclera clear and conjunctiva normal  ENT: Normocephalic, without obvious abnormality, atraumatic  NECK: supple, symmetrical, no jugular venous distension and no carotid bruits   HEMATOLOGIC/LYMPHATIC: no cervical, supraclavicular or axillary lymphadenopathy   LUNGS: no increased work of breathing and clear to auscultation   CARDIOVASCULAR: regular rate and rhythm, normal S1 and S2, no murmur noted  ABDOMEN: normal bowel sound, soft, non-distended, non-tender, no masses palpated, no hepatosplenomegaly   MUSCULOSKELETAL: full range of motion noted, tone is normal  NEUROLOGIC: Motor skills grossly intact. CN II - XII grossly intact. SKIN: Normal skin color, texture, turgor and no jaundice. appears intact   EXTREMITIES: no LE edema      Labs:  Hematology:  Lab Results   Component Value Date    WBC 9.1 10/28/2022    RBC 5.07 10/28/2022    HGB 15.9 10/28/2022    HCT 46.5 10/28/2022    MCV 91.7 10/28/2022    MCH 31.4 (H) 10/28/2022    MCHC 34.2 10/28/2022    RDW 12.7 10/28/2022     10/28/2022    MPV 8.6 10/28/2022    SEGSPCT 69.2 (H) 10/28/2022    EOSRELPCT 5.7 (H) 10/28/2022    BASOPCT 0.5 10/28/2022    LYMPHOPCT 17.3 (L) 10/28/2022    MONOPCT 7.3 (H) 10/28/2022    SEGSABS 6.3 10/28/2022    EOSABS 0.5 10/28/2022    BASOSABS 0.1 10/28/2022    LYMPHSABS 1.6 10/28/2022    MONOSABS 0.7 10/28/2022    DIFFTYPE AUTOMATED DIFFERENTIAL 10/28/2022     Lab Results   Component Value Date    ESR 58 (H) 10/28/2022     Chemistry:  Lab Results   Component Value Date     (L) 10/28/2022    K 4.6 10/28/2022    CL 98 (L) 10/28/2022    CO2 25 10/28/2022    BUN 20 10/28/2022    CREATININE 0.9 10/28/2022    GLUCOSE 84 10/28/2022    CALCIUM 9.4 10/28/2022    PROT 9.4 (H) 10/28/2022    PROT 9.4 (H) 10/28/2022    LABALBU 3.8 10/28/2022    BILITOT 0.6 10/28/2022    ALKPHOS 85 10/28/2022    AST 20 10/28/2022    ALT 23 10/28/2022    LABGLOM >60 10/28/2022    GFRAA >60 06/26/2021     Immunology:  Lab Results   Component Value Date    PROT 9.4 (H) 10/28/2022    PROT 9.4 (H) 10/28/2022    SPEP  10/28/2022     INTERPRETATION - Increase in gamma fraction and total protein. Patito Beauchamp MD    SPEP  10/28/2022     INTERPRETATION - Monoclonal gammopathy is not identified. Christine Willis MD    ALBUMINELP 3.4 10/28/2022    LABALPH 0.3 10/28/2022    LABALPH 0.7 10/28/2022    LABBETA 1.1 10/28/2022    GAMGLOB 3.9 (H) 10/28/2022      Observations:  PHQ-9 Total Score: 0 (11/23/2022 10:45 AM)     Assessment & Plan:  1. He was referred for evaluation of increased IgG. Currently asymptomatic. I ordered w/u as above. He has history of hypersensitivity pneumonitis, responding to steroid. I recommend to monitor for lymphadenopathy. Labs reviewed and I referred to rheumatologist for + JEFF, RF. This may be the cause of elevated IgG and total protein. 2. I recommend to keep colon cancer screening up to date. RTC in 6 weeks or sooner. All of the questions have been answered for today. I have discussed the above stated plan with the patient and they verbalized understanding and agreed with the plan.

## 2022-11-08 LAB — SPEP INTERPRETATION: NORMAL

## 2022-11-23 ENCOUNTER — OFFICE VISIT (OUTPATIENT)
Dept: ONCOLOGY | Age: 42
End: 2022-11-23
Payer: COMMERCIAL

## 2022-11-23 ENCOUNTER — HOSPITAL ENCOUNTER (OUTPATIENT)
Dept: INFUSION THERAPY | Age: 42
Discharge: HOME | End: 2022-11-23
Payer: COMMERCIAL

## 2022-11-23 VITALS
BODY MASS INDEX: 36.97 KG/M2 | HEIGHT: 70 IN | OXYGEN SATURATION: 94 % | SYSTOLIC BLOOD PRESSURE: 129 MMHG | RESPIRATION RATE: 18 BRPM | HEART RATE: 116 BPM | WEIGHT: 258.2 LBS | TEMPERATURE: 98 F | DIASTOLIC BLOOD PRESSURE: 72 MMHG

## 2022-11-23 DIAGNOSIS — M35.9 CONNECTIVE TISSUE DISORDER (HCC): Primary | ICD-10-CM

## 2022-11-23 PROCEDURE — 99211 OFF/OP EST MAY X REQ PHY/QHP: CPT

## 2022-11-23 PROCEDURE — 99214 OFFICE O/P EST MOD 30 MIN: CPT | Performed by: INTERNAL MEDICINE

## 2022-11-23 ASSESSMENT — PATIENT HEALTH QUESTIONNAIRE - PHQ9
1. LITTLE INTEREST OR PLEASURE IN DOING THINGS: 0
SUM OF ALL RESPONSES TO PHQ QUESTIONS 1-9: 0
SUM OF ALL RESPONSES TO PHQ QUESTIONS 1-9: 0
SUM OF ALL RESPONSES TO PHQ9 QUESTIONS 1 & 2: 0
SUM OF ALL RESPONSES TO PHQ QUESTIONS 1-9: 0
2. FEELING DOWN, DEPRESSED OR HOPELESS: 0
SUM OF ALL RESPONSES TO PHQ QUESTIONS 1-9: 0

## 2022-11-23 NOTE — PROGRESS NOTES
MA Rooming Questions  Patient: Antonio Welch  MRN: [id number]    Date: 11/23/2022        1. Do you have any new issues?   no         2. Do you need any refills on medications?    no    3. Have you had any imaging done since your last visit? yes - U/S    4. Have you been hospitalized or seen in the emergency room since your last visit here?   no    5. Did the patient have a depression screening completed today?  Yes    PHQ-9 Total Score: 0 (11/23/2022 10:45 AM)       PHQ-9 Given to (if applicable):               PHQ-9 Score (if applicable):                     [] Positive     []  Negative              Does question #9 need addressed (if applicable)                     [] Yes    []  No               Maricarmen Brewer CMA

## 2023-02-23 NOTE — PROGRESS NOTES
RHEUMATOLOGY NEW PATIENT VISIT    2023      Patient Name: Mary Ann Santillan  : 1980  Medical Record: [id number]      CHIEF COMPLAINT    Elevated IgG  Elevated rheumatoid factor  Elevated ESR    Pertinent problems  Prediabetes   HLD    HISTORY OF PRESENT ILLNESS    Mary Ann Santillan is a 38 y.o. male who was referred by Javier Drew for above problems. Symptom onset began in  after he had covid and was treated for interstitial pneumonitis. W/u showed that IgG was elevated for which he was referred to Oncology. He underwent BAL for RML in 2021. That showed no Malignant Cells Are Identified, Alveolar Macrophage, Acute Inflammation and Few Eosinophils     CT chest at [facility] 2021:   The groundglass attenuation seen previously has improved. The mosaic pattern of attenuation, most prominent in the lung bases, felt to represent air trapping when correlated the prior study that included expiratory imaging, remains. Certainly subacute hypersensitivity pneumonitis could have this appearance, and imaging suggests some interval improvement. 2021 SPEP: no monoclonal bands. Increased polyclonal gamma globulins. Today, he reports feet, neck, left lower back, hips and elbow pain on and off after prolonged standing and activity. He reports that he feels he is at baseline, pain is stable and chronic. Overall Discomfort:3-5/10 , worse in his neck which is the most consistent, then his left lower back. There is no night time or early morning back pain. Swelling: none   AM stiffness: yes in lower back and neck for a couple of hours   Medications: he is not on medications for neck pain, takes tylenol once in a while for headache. Improved with: Activity helps back pain   Worse with:  Movement worsens neck pain      Abdominal pain: no   Fever: no   Weight changes: no   Night sweats : no   Hair loss: yes after covid   Oral Ulcers: Denies  Dry eyes and mouth: mild dry eyes   Rashes: Denies  Photosensitivity: yes +   Photophobia: Denies  Joint Pains: +  Raynaud's: yes left index finger turns white  Chest Pain: Denies  SOB: Denies  Blood Clots: Denies  Seizures/strokes: Denies  Anemia/thrombocytopenia/leukopenia: Lymphocytopenia     Current rheum meds: none     Past rheum meds:     Denies smoking, social etoh, no recreational drug use. 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 No flowsheet data found. REVIEW OF SYSTEMS     Constitutional:  Denies fever or chills, decreased appetite, or weight loss   Eyes:  Dry eyes+   HENT:  Denies dry mouth or oral ulcers  Respiratory:  Denies cough or shortness of breath   Cardiovascular:  Denies chest pain or edema   GI:  Denies abdominal pain, nausea, vomiting, bloody stools or diarrhea   :  Denies dysuria or hematuria  Musculoskeletal:  See HPI  Integument:  Denies rash   Neurologic:  Denies headache, focal weakness or sensory changes   Endocrine:  Denies polyuria or polydipsia   Lymphatic:  Denies swollen glands   Psychiatric:  Denies depression or anxiety       PROBLEM LIST    Patient Active Problem List   Diagnosis    Prediabetes    Dyslipidemia    Hypersensitivity pneumonia (HCC)       MEDICATIONS    Current Outpatient Medications   Medication Sig Dispense Refill    Multiple Vitamin (MULTIVITAMIN) capsule Take 1 capsule by mouth daily      loratadine (CLARITIN) 10 MG tablet Take 10 mg by mouth daily      albuterol sulfate  (90 Base) MCG/ACT inhaler Inhale 2 puffs into the lungs every 6 hours as needed for Wheezing 1 Inhaler 0     No current facility-administered medications for this visit.        ALLERGIES    Allergies   Allergen Reactions    Latex     Penicillins        PAST MEDICAL HISTORY      Past Medical History:   Diagnosis Date    GERD (gastroesophageal reflux disease)     History of concussion     Teenager         SOCIAL HISTORY     Social History     Socioeconomic History    Marital status:  Number of children: 0   Occupational History    Occupation: [employer]   Tobacco Use    Smoking status: Never    Smokeless tobacco: Never   Substance and Sexual Activity    Alcohol use: Never    Drug use: Never    Sexual activity: Yes     Partners: Female     Social Determinants of Health     Financial Resource Strain: Low Risk     Difficulty of Paying Living Expenses: Not hard at all   Food Insecurity: No Food Insecurity    Worried About Running Out of Food in the Last Year: Never true    Ran Out of Food in the Last Year: Never true         FAMILY HISTORY     Family History   Problem Relation Age of Onset    Seizures Mother     Rheum Arthritis Mother     Coronary Art Dis Father     Diabetes Father     COPD Father          PHYSICAL EXAM     Wt Readings from Last 3 Encounters:   02/24/23 256 lb (116.1 kg)   11/23/22 258 lb 3.2 oz (117.1 kg)   10/28/22 260 lb 3.2 oz (118 kg)     Temp Readings from Last 3 Encounters:   11/23/22 98 °F (36.7 °C) (Temporal)   10/28/22 98.3 °F (36.8 °C) (Temporal)   05/25/21 97.3 °F (36.3 °C)     BP Readings from Last 3 Encounters:   02/24/23 115/80   11/23/22 129/72   10/28/22 123/78     Pulse Readings from Last 3 Encounters:   02/24/23 92   11/23/22 (!) 116   10/28/22 96       General appearance:  Alert and oriented, NAD, well developed   HEENT: EOMI, no scleral injection, moist mucous membranes, no oral ulcers, normal hearing, no cartilage inflammation  Neck: Trachea midline, no masses  Lymph: no LAD  Lungs: CTAB, no rales  Heart: regular rate and rhythm, S1, S2 normal, no murmur, no lower extremity edema  Abdomen: Soft, ND, NT. + BS. Extremities: atraumatic, no cyanosis or edema. Neurologic: CN 2-12 grossly intact. Skin: No active rashes, warm and dry, no telangiectasias, no digital pitting, no sclerodactyly, no rheumatoid nodules, no livedo  MSK: normal ROM of the small joints of the hands, wrists, elbows, shoulders, hips, knees, ankles, or MTPs.    5/5 strength of the proximal and distal muscles of the upper and lower extremities. HANDS: no synovitis, good ROM,   Elbow: No synovitis, good ROM,   Shoulder:good ROM,   Knee: no effusion, good ROM,   Ankle:good ROM,   FEET: neg forefoot squeeze test    Spine:  Normal range of motion; no tender points, no obvious deformities. Neuro:  Alert & oriented x 3, normal motor function, normal sensory function, no focal deficits noted . Muscle strength: 4/4 in bilateral upper and lower extremities. Psychiatric: Mood and affect are appropriate, recent and remote memory normal,          LABS AND IMAGING    Available labs were reviewed and discussed with patient   10/20/2022  JEFF <1: 80  IgG 4143, H  IgM 225  ESR 58, H  WBC 9.1, WNL  Hemoglobin 15.9, within normal  Platelets 779, within normal  Lymphocytes 17.3, low  Eosinophils 5.7, H  Monocytes 7.3, H    HRCT on 7/6/2021  Groundglass Opacities:  Extensive centrilobular groundglass with patchy areas   of sparing most prominent near the bases. Findings suggestive of subacute cluster 1 hypersensitivity pneumonitis. Acute interstitial pneumonia and respiratory bronchiolitis are considered   less likely. 2. No findings of interstitial fibrosis at this time. CT chest 11/21/2021  IMPRESSION:   The groundglass attenuation seen previously has improved. The mosaic pattern   of attenuation, most prominent in the lung bases, felt to represent air   trapping when correlated the prior study that included expiratory imaging,   remains. Certainly subacute hypersensitivity pneumonitis could have this   appearance, and imaging suggests some interval improvement. Assessment   Patient is a 68-year-old male with history of elevated immunoglobulin G, and elevated inflammatory markers of uncertain cause. He was treated with COVID in 2020/2021 and subsequently developed interstitial pneumonitis. Repeat CT chest in 11/2021 showed improvement in the groundglass attenuation.   Patient reports chronic neck and lower back pain that [facility] and has still remained stable over the years. Otherwise, he has no other symptoms JEFF was negative in 2022 and SPEP does not suggest lymphoma. We will repeat lupus and other connective tissue disease work-up, and check for IgG4 levels. Will consider CT abdomen and pelvis can if inflammatory markers remain elevated. 1. Serologic abnormality  - lupus comprehensive panel by [facility]    - MISCELLANEOUS LAB TEST #1; Future  - Sedimentation Rate; Future  - C-Reactive Protein; Future  - Comprehensive Metabolic Panel; Future  - CBC; Future  - RHEUMATOID FACTOR; Future  - CYCLIC CITRUL PEPTIDE ANTIBODY, IGG; Future    2. Polyarthralgia  - lupus comprehensive panel by [facility]    - MISCELLANEOUS LAB TEST #1; Future  - Sedimentation Rate; Future  - C-Reactive Protein; Future  - XR CERVICAL SPINE (2-3 VIEWS); Future  - RHEUMATOID FACTOR; Future  - CYCLIC CITRUL PEPTIDE ANTIBODY, IGG; Future    3. Elevated erythrocyte sedimentation rate  - MISCELLANEOUS LAB TEST #1; Future  - Sedimentation Rate; Future  - C-Reactive Protein; Future  - XR CERVICAL SPINE (2-3 VIEWS); Future  - Comprehensive Metabolic Panel; Future  - CBC; Future    4. Elevated C-reactive protein (CRP)  - MISCELLANEOUS LAB TEST #1; Future  - Sedimentation Rate; Future  - C-Reactive Protein; Future  - XR CERVICAL SPINE (2-3 VIEWS); Future  - Comprehensive Metabolic Panel; Future  - CBC; Future     Patient Instructions  Complete ordered labs and get imaging done  Will consider CT abdomen and pelvis if your labs are still abnormal   We will discuss results at next visit  RTC in 3 weeks     -  The patient indicates understanding of these issues and agrees with the plan.     I spent 60 minutes on the date of service, preparing to see the patient (eg, review of tests), obtaining and/or reviewing separately obtained history, counseling and educating the family/caregiver, ordering medications, tests, or procedures, referring and communicating with other health care professionals, documenting clinical information in the electronic or other health record, care coordination (not separately reported)      Ino Partida MD

## 2023-02-24 ENCOUNTER — OFFICE VISIT (OUTPATIENT)
Dept: RHEUMATOLOGY | Age: 43
End: 2023-02-24
Payer: COMMERCIAL

## 2023-02-24 VITALS
HEART RATE: 92 BPM | SYSTOLIC BLOOD PRESSURE: 115 MMHG | DIASTOLIC BLOOD PRESSURE: 80 MMHG | WEIGHT: 256 LBS | OXYGEN SATURATION: 99 % | BODY MASS INDEX: 36.73 KG/M2

## 2023-02-24 DIAGNOSIS — R89.4 SEROLOGIC ABNORMALITY: Primary | ICD-10-CM

## 2023-02-24 DIAGNOSIS — M25.50 POLYARTHRALGIA: ICD-10-CM

## 2023-02-24 DIAGNOSIS — R70.0 ELEVATED ERYTHROCYTE SEDIMENTATION RATE: ICD-10-CM

## 2023-02-24 DIAGNOSIS — R79.82 ELEVATED C-REACTIVE PROTEIN (CRP): ICD-10-CM

## 2023-02-24 PROCEDURE — 99205 OFFICE O/P NEW HI 60 MIN: CPT | Performed by: STUDENT IN AN ORGANIZED HEALTH CARE EDUCATION/TRAINING PROGRAM

## 2023-03-02 ENCOUNTER — HOSPITAL ENCOUNTER (OUTPATIENT)
Dept: GENERAL RADIOLOGY | Age: 43
Discharge: HOME | End: 2023-03-02
Payer: COMMERCIAL

## 2023-03-02 ENCOUNTER — HOSPITAL ENCOUNTER (OUTPATIENT)
Age: 43
Discharge: HOME | End: 2023-03-02
Payer: COMMERCIAL

## 2023-03-02 DIAGNOSIS — R79.82 ELEVATED C-REACTIVE PROTEIN (CRP): ICD-10-CM

## 2023-03-02 DIAGNOSIS — R70.0 ELEVATED ERYTHROCYTE SEDIMENTATION RATE: ICD-10-CM

## 2023-03-02 DIAGNOSIS — M25.50 POLYARTHRALGIA: ICD-10-CM

## 2023-03-02 PROCEDURE — 72040 X-RAY EXAM NECK SPINE 2-3 VW: CPT

## 2023-03-24 ENCOUNTER — OFFICE VISIT (OUTPATIENT)
Dept: RHEUMATOLOGY | Age: 43
End: 2023-03-24
Payer: COMMERCIAL

## 2023-03-24 VITALS
HEART RATE: 98 BPM | BODY MASS INDEX: 37.22 KG/M2 | OXYGEN SATURATION: 90 % | RESPIRATION RATE: 14 BRPM | WEIGHT: 260 LBS | HEIGHT: 70 IN

## 2023-03-24 DIAGNOSIS — R76.8 ANTI-RNP ANTIBODIES PRESENT: ICD-10-CM

## 2023-03-24 DIAGNOSIS — R70.0 ELEVATED ERYTHROCYTE SEDIMENTATION RATE: ICD-10-CM

## 2023-03-24 DIAGNOSIS — M35.05 SJOGREN'S SYNDROME WITH INFLAMMATORY ARTHRITIS (HCC): ICD-10-CM

## 2023-03-24 DIAGNOSIS — R79.82 ELEVATED C-REACTIVE PROTEIN (CRP): ICD-10-CM

## 2023-03-24 DIAGNOSIS — M35.1 MCTD (MIXED CONNECTIVE TISSUE DISEASE) (HCC): Primary | ICD-10-CM

## 2023-03-24 PROCEDURE — 99215 OFFICE O/P EST HI 40 MIN: CPT | Performed by: STUDENT IN AN ORGANIZED HEALTH CARE EDUCATION/TRAINING PROGRAM

## 2023-03-24 RX ORDER — HYDROXYCHLOROQUINE SULFATE 200 MG/1
200 TABLET, FILM COATED ORAL 2 TIMES DAILY
Qty: 120 TABLET | Refills: 0 | Status: CANCELLED | OUTPATIENT
Start: 2023-03-24

## 2023-03-24 NOTE — PROGRESS NOTES
3, normal motor function, normal sensory function, no focal deficits noted . Muscle strength: 4/4 in bilateral upper and lower extremities. Psychiatric: Mood and affect are appropriate, recent and remote memory normal,      LABS AND IMAGING    Available labs were reviewed and discussed with patient   10/20/2022  JEFF <1: 80  IgG 4143, H  IgM 225  ESR 58, H  WBC 9.1, WNL  Hemoglobin 15.9, within normal  Platelets 674, within normal  Lymphocytes 17.3, low  Eosinophils 5.7, H  Monocytes 7.3, H      2/24/2023  JEFF by IFA neg   dsDNA by [facility] negative  Anti-Ro 52 IgG positive  Anti-Ro 60 IgG positive  Anti-U1 RNP positive  Subserologies neg     Total protein 9.5, H   globulin 5.2, H  CRP 0.9 (<0.8), H  RF 61, H  ESR 78, H  CCP 85, H  IgG subclass 1,  2403.9, H      HRCT on 7/6/2021  Groundglass Opacities:  Extensive centrilobular groundglass with patchy areas   of sparing most prominent near the bases. Findings suggestive of subacute cluster 1 hypersensitivity pneumonitis. Acute interstitial pneumonia and respiratory bronchiolitis are considered   less likely. 2. No findings of interstitial fibrosis at this time. CT chest 11/21/2021  IMPRESSION:   The groundglass attenuation seen previously has improved. The mosaic pattern   of attenuation, most prominent in the lung bases, felt to represent air   trapping when correlated the prior study that included expiratory imaging,   remains. Certainly subacute hypersensitivity pneumonitis could have this   appearance, and imaging suggests some interval improvement. X-ray cervical spine normal findings. Assessment   Patient is a 49-year-old male with history of elevated immunoglobulin G, and elevated inflammatory markers  He was treated with COVID in 2020/2021 and subsequently developed interstitial pneumonitis. Repeat CT chest in 11/2021 showed improvement in the groundglass attenuation.       Patient is presenting with arthralgia, dry eyes and

## 2023-03-30 ENCOUNTER — HOSPITAL ENCOUNTER (OUTPATIENT)
Dept: INFUSION THERAPY | Age: 43
Discharge: HOME | End: 2023-03-30
Payer: COMMERCIAL

## 2023-03-30 ENCOUNTER — OFFICE VISIT (OUTPATIENT)
Dept: ONCOLOGY | Age: 43
End: 2023-03-30
Payer: COMMERCIAL

## 2023-03-30 VITALS
HEART RATE: 86 BPM | DIASTOLIC BLOOD PRESSURE: 82 MMHG | OXYGEN SATURATION: 93 % | SYSTOLIC BLOOD PRESSURE: 118 MMHG | WEIGHT: 262 LBS | TEMPERATURE: 97.9 F | HEIGHT: 70 IN | BODY MASS INDEX: 37.51 KG/M2

## 2023-03-30 DIAGNOSIS — M35.9 CONNECTIVE TISSUE DISORDER (HCC): Primary | ICD-10-CM

## 2023-03-30 DIAGNOSIS — R76.8 ELEVATED SERUM IMMUNOGLOBULIN FREE LIGHT CHAIN LEVEL: ICD-10-CM

## 2023-03-30 PROCEDURE — 99211 OFF/OP EST MAY X REQ PHY/QHP: CPT

## 2023-03-30 PROCEDURE — 99213 OFFICE O/P EST LOW 20 MIN: CPT | Performed by: INTERNAL MEDICINE

## 2023-03-30 ASSESSMENT — PATIENT HEALTH QUESTIONNAIRE - PHQ9
SUM OF ALL RESPONSES TO PHQ QUESTIONS 1-9: 0
1. LITTLE INTEREST OR PLEASURE IN DOING THINGS: 0
SUM OF ALL RESPONSES TO PHQ9 QUESTIONS 1 & 2: 0
2. FEELING DOWN, DEPRESSED OR HOPELESS: 0
SUM OF ALL RESPONSES TO PHQ QUESTIONS 1-9: 0

## 2023-03-30 NOTE — PROGRESS NOTES
MA Rooming Questions  Patient: Osvaldo An  MRN: [id number]    Date: 3/30/2023        1. Do you have any new issues?   no         2. Do you need any refills on medications?    no    3. Have you had any imaging done since your last visit? yes - labs @ [facility]    4. Have you been hospitalized or seen in the emergency room since your last visit here?   no    5. Did the patient have a depression screening completed today?  Yes    PHQ-9 Total Score: 0 (3/30/2023  9:40 AM)       PHQ-9 Given to (if applicable):               PHQ-9 Score (if applicable):                     [] Positive     []  Negative              Does question #9 need addressed (if applicable)                     [] Yes    []  No               Tirso Joyner CMA
ALBUMINELP 3.4 10/28/2022    LABALPH 0.3 10/28/2022    LABALPH 0.7 10/28/2022    LABBETA 1.1 10/28/2022    GAMGLOB 3.9 (H) 10/28/2022      Observations:  PHQ-9 Total Score: 0 (3/30/2023  9:40 AM)     Assessment & Plan:  1. He was referred for evaluation of increased IgG. Currently asymptomatic. I ordered w/u as above. He has history of hypersensitivity pneumonitis, responding to steroid. I recommend to monitor for lymphadenopathy. Labs reviewed and I referred to rheumatologist for + JEFF, RF. This may be the cause of elevated IgG and total protein. He was diagnosed with mixed connective tissue disorder by rheumatologist and refused to take medication. I recommend to follow up with rheumatologist closely. I recommend to check of peripheral LAD. I will repeat serum light chain study prior to next OV. 2. I recommend to keep colon cancer screening up to date. RTC in 6 months or sooner. All of the questions have been answered for today. I have discussed the above stated plan with the patient and they verbalized understanding and agreed with the plan.

## 2023-04-06 ENCOUNTER — TELEPHONE (OUTPATIENT)
Dept: RHEUMATOLOGY | Age: 43
End: 2023-04-06

## 2023-04-07 ENCOUNTER — HOSPITAL ENCOUNTER (OUTPATIENT)
Dept: CT IMAGING | Age: 43
Discharge: HOME | End: 2023-04-07
Payer: COMMERCIAL

## 2023-04-07 ENCOUNTER — HOSPITAL ENCOUNTER (OUTPATIENT)
Dept: CARDIAC REHAB | Age: 43
Discharge: HOME | End: 2023-04-07
Payer: COMMERCIAL

## 2023-04-07 DIAGNOSIS — M35.1 MCTD (MIXED CONNECTIVE TISSUE DISEASE) (HCC): ICD-10-CM

## 2023-04-07 DIAGNOSIS — R76.8 ANTI-RNP ANTIBODIES PRESENT: ICD-10-CM

## 2023-04-07 DIAGNOSIS — R79.82 ELEVATED C-REACTIVE PROTEIN (CRP): ICD-10-CM

## 2023-04-07 DIAGNOSIS — M35.05 SJOGREN'S SYNDROME WITH INFLAMMATORY ARTHRITIS (HCC): ICD-10-CM

## 2023-04-07 DIAGNOSIS — R70.0 ELEVATED ERYTHROCYTE SEDIMENTATION RATE: ICD-10-CM

## 2023-04-07 LAB
LV EF: 58 %
LVEF MODALITY: NORMAL

## 2023-04-07 PROCEDURE — 71250 CT THORAX DX C-: CPT

## 2023-04-07 PROCEDURE — 93306 TTE W/DOPPLER COMPLETE: CPT

## 2023-06-02 ENCOUNTER — OFFICE VISIT (OUTPATIENT)
Dept: INTERNAL MEDICINE CLINIC | Age: 43
End: 2023-06-02
Payer: COMMERCIAL

## 2023-06-02 VITALS
WEIGHT: 257.6 LBS | DIASTOLIC BLOOD PRESSURE: 80 MMHG | HEART RATE: 101 BPM | SYSTOLIC BLOOD PRESSURE: 105 MMHG | OXYGEN SATURATION: 90 % | HEIGHT: 70 IN | BODY MASS INDEX: 36.88 KG/M2

## 2023-06-02 DIAGNOSIS — J98.4 SMALL AIRWAYS DISEASE: ICD-10-CM

## 2023-06-02 DIAGNOSIS — M35.1 MCTD (MIXED CONNECTIVE TISSUE DISEASE) (HCC): ICD-10-CM

## 2023-06-02 DIAGNOSIS — E78.5 DYSLIPIDEMIA: ICD-10-CM

## 2023-06-02 DIAGNOSIS — Z13.1 SCREENING FOR DIABETES MELLITUS: ICD-10-CM

## 2023-06-02 DIAGNOSIS — Z00.00 ANNUAL PHYSICAL EXAM: Primary | ICD-10-CM

## 2023-06-02 DIAGNOSIS — G89.29 CHRONIC LEFT-SIDED LOW BACK PAIN WITHOUT SCIATICA: ICD-10-CM

## 2023-06-02 DIAGNOSIS — G89.29 CHRONIC NECK PAIN: ICD-10-CM

## 2023-06-02 DIAGNOSIS — M54.50 CHRONIC LEFT-SIDED LOW BACK PAIN WITHOUT SCIATICA: ICD-10-CM

## 2023-06-02 DIAGNOSIS — M54.2 CHRONIC NECK PAIN: ICD-10-CM

## 2023-06-02 DIAGNOSIS — M35.05 SJOGREN SYNDROME WITH INFLAMMATORY ARTHRITIS (HCC): ICD-10-CM

## 2023-06-02 PROCEDURE — 99396 PREV VISIT EST AGE 40-64: CPT | Performed by: FAMILY MEDICINE

## 2023-06-02 RX ORDER — ALBUTEROL SULFATE 90 UG/1
2 AEROSOL, METERED RESPIRATORY (INHALATION) EVERY 6 HOURS PRN
Qty: 1 EACH | Refills: 2 | Status: SHIPPED | OUTPATIENT
Start: 2023-06-02

## 2023-06-02 SDOH — ECONOMIC STABILITY: FOOD INSECURITY: WITHIN THE PAST 12 MONTHS, YOU WORRIED THAT YOUR FOOD WOULD RUN OUT BEFORE YOU GOT MONEY TO BUY MORE.: NEVER TRUE

## 2023-06-02 SDOH — ECONOMIC STABILITY: INCOME INSECURITY: HOW HARD IS IT FOR YOU TO PAY FOR THE VERY BASICS LIKE FOOD, HOUSING, MEDICAL CARE, AND HEATING?: NOT HARD AT ALL

## 2023-06-02 SDOH — ECONOMIC STABILITY: HOUSING INSECURITY
IN THE LAST 12 MONTHS, WAS THERE A TIME WHEN YOU DID NOT HAVE A STEADY PLACE TO SLEEP OR SLEPT IN A SHELTER (INCLUDING NOW)?: NO

## 2023-06-02 SDOH — ECONOMIC STABILITY: FOOD INSECURITY: WITHIN THE PAST 12 MONTHS, THE FOOD YOU BOUGHT JUST DIDN'T LAST AND YOU DIDN'T HAVE MONEY TO GET MORE.: NEVER TRUE

## 2023-06-02 ASSESSMENT — ENCOUNTER SYMPTOMS
CONSTIPATION: 0
BLOOD IN STOOL: 0
COUGH: 0
DIARRHEA: 0
NAUSEA: 0
SHORTNESS OF BREATH: 0
ABDOMINAL PAIN: 0

## 2023-06-02 NOTE — PROGRESS NOTES
Well Adult Note  Name: Elsie Shown Date: 2023   MRN: [id number] Sex: Male   Age: 38 y.o. Ethnicity: Non- / Non    : 1980 Race: White (non-)      Jero Arndt is here for well adult exam.  History:  Patient Active Problem List    Diagnosis Date Noted    Prediabetes 2022    Dyslipidemia 2022    Hypersensitivity pneumonia ([facility] [address] 2022    Sjogren syndrome with inflammatory arthritis ([address] 2023    MCTD (mixed connective tissue disease) ([address] 2023     Dx with MCTD and Sjogrens- follows with Dr. Falguni Zuniga. He opted not do use medications  Hx hypersensitivity pneumonitis  Dyslipidemia  Neck and back ache- her is receiving treatment from  Dr Jasmyne Hernandez      Review of Systems   Constitutional:  Negative for diaphoresis and fever. HENT: Negative. Eyes:  Negative for visual disturbance. Respiratory:  Negative for cough and shortness of breath. Cardiovascular:  Negative for chest pain and palpitations. Gastrointestinal:  Negative for abdominal pain, blood in stool, constipation, diarrhea and nausea. Genitourinary:  Negative for difficulty urinating and dysuria. Musculoskeletal:  Positive for back pain (chronic) and neck pain (chronic). Neurological:  Negative for dizziness, light-headedness and headaches. Psychiatric/Behavioral:  Negative for dysphoric mood. Allergies   Allergen Reactions    Latex     Penicillins          Prior to Visit Medications    Medication Sig Taking?  Authorizing Provider   albuterol sulfate HFA (PROVENTIL;VENTOLIN;PROAIR) 108 (90 Base) MCG/ACT inhaler Inhale 2 puffs into the lungs every 6 hours as needed for Wheezing Yes Alonso Luo, DO   Multiple Vitamin (MULTIVITAMIN) capsule Take 1 capsule by mouth daily Yes Historical Provider, MD   loratadine (CLARITIN) 10 MG tablet Take 1 tablet by mouth daily Yes Historical Provider, MD         Past Medical History:   Diagnosis Date    GERD (gastroesophageal reflux

## 2023-06-04 ASSESSMENT — ENCOUNTER SYMPTOMS: BACK PAIN: 1

## 2023-06-19 NOTE — PROGRESS NOTES
RHEUMATOLOGY FOLLOW-UP VISIT    2023      Patient Name: Lupe Sher  : 1980  Medical Record: [id number]      CHIEF COMPLAINT    MCTD   - Raynauds  - RF, CCP+  - RNP+   - Anti centromere +  - SSA+  Elevated IgG  Elevated rheumatoid factor  Elevated ESR    Pertinent problems  Prediabetes   HLD    HISTORY OF PRESENT ILLNESS    Lupe Sher is a 38 y.o. male who established on . Symptom onset began in  after he had covid and was treated for interstitial pneumonitis. W/u showed that IgG was elevated for which he was referred to Oncology. He underwent BAL for RML in 2021. That showed no Malignant Cells Are Identified, Alveolar Macrophage, Acute Inflammation and Few Eosinophils     [facility] chest at [facility] 2021:   The groundglass attenuation seen previously has improved. The mosaic pattern of attenuation, most prominent in the lung bases, felt to represent air trapping when correlated the prior study that included expiratory imaging, remains. Certainly subacute hypersensitivity pneumonitis could have this appearance, and imaging suggests some interval improvement. 2021 SPEP: no monoclonal bands. Increased polyclonal gamma globulins. There is feet, neck, left lower back, hips and elbow pain on and off after prolonged standing and activity which he describes as chronic. Overall Discomfort:3-5/10 , worse in his neck which is the most consistent, then his left lower back. There is no night time or early morning back pain. Swelling: none   AM stiffness: yes in lower back and neck for a couple of hours   Improved with: Activity helps back pain   Worse with: Movement worsens neck pain      Dry eyes and mouth: mild dry eyes   Photosensitivity: yes +   Joint Pains: +  Raynaud's: yes left index finger turns white  Anemia/thrombocytopenia/leukopenia: Lymphocytopenia   Denies smoking, social etoh, no recreational drug use.  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

## 2023-06-22 ENCOUNTER — OFFICE VISIT (OUTPATIENT)
Dept: RHEUMATOLOGY | Age: 43
End: 2023-06-22
Payer: COMMERCIAL

## 2023-06-22 VITALS
BODY MASS INDEX: 37.02 KG/M2 | HEART RATE: 99 BPM | DIASTOLIC BLOOD PRESSURE: 75 MMHG | WEIGHT: 258 LBS | SYSTOLIC BLOOD PRESSURE: 120 MMHG | OXYGEN SATURATION: 95 %

## 2023-06-22 DIAGNOSIS — M05.79 RHEUMATOID ARTHRITIS INVOLVING MULTIPLE SITES WITH POSITIVE RHEUMATOID FACTOR (HCC): ICD-10-CM

## 2023-06-22 DIAGNOSIS — R79.82 ELEVATED C-REACTIVE PROTEIN (CRP): ICD-10-CM

## 2023-06-22 DIAGNOSIS — M35.1 MCTD (MIXED CONNECTIVE TISSUE DISEASE) (HCC): Primary | ICD-10-CM

## 2023-06-22 DIAGNOSIS — M35.05 SJOGREN SYNDROME WITH INFLAMMATORY ARTHRITIS (HCC): ICD-10-CM

## 2023-06-22 PROCEDURE — 99214 OFFICE O/P EST MOD 30 MIN: CPT | Performed by: STUDENT IN AN ORGANIZED HEALTH CARE EDUCATION/TRAINING PROGRAM

## 2023-08-15 ENCOUNTER — TELEPHONE (OUTPATIENT)
Dept: INTERNAL MEDICINE CLINIC | Age: 43
End: 2023-08-15

## 2023-08-15 NOTE — TELEPHONE ENCOUNTER
Pt called, c/o poison ivy on arms, legs, and \"unmentionable areas\". He is requesting medication to be called in to [facility]. Pt declined appt, stating he just went to  2 weeks ago, for poison ivy, and cannot afford another appt. His wife is having surgery tomorrow, and he will not be able to make an appt for 2 more weeks. He is requesting return call, with PCP response.

## 2023-08-16 NOTE — TELEPHONE ENCOUNTER
Spoke to patient and he went to [facility] [phone]. I called to have records sent to us and was advised they do not send records to the providers. Discharge information is given to the patient to bring to us. I did however LM for medical records to try and get a copy sent to us as the office would not release them. I spoke again to the patient and he thinks he still has his paperwork. He was waiting on his wife to come out of surgery at this time. But when he has the time available he will try to find them and drop them off here also. He was hoping he could get something for the poison Moisesmaik Garner since it has been going on for so long.

## 2023-09-06 NOTE — PROGRESS NOTES
Patient Name:  Will Branham  Patient :  1980  Patient MRN:  [id number]     Primary Oncologist: Brittany Claire MD  Referring Provider: Clint Leone DO     Date of Service: 10/2/2023       Chief Complaint:    Chief Complaint   Patient presents with    Follow-up    Results      He came in for follow up visit. Patient Active Problem List:     Prediabetes     Dyslipidemia     Hypersensitivity pneumonia     HPI:   Will Branham is a pleasant 38year old male patient who was referred for evaluation of increase immunoglobulin. He saw pulmonologist at [facility] for post COVID syndrome and  nonspecific hypersensitivity pneumonitis 2021. He was treated with prednisone for 3 - 4 weeks with improvement of the symptoms. He has BAL for RML in 2021. He had COVID in . FINAL DIAGNOSIS:  ? No Malignant Cells Are Identified  ? Alveolar Macrophages  ? Acute Inflammation, Few Eosinophils    CT chest at [facility] 2021:   The groundglass attenuation seen previously has improved. The mosaic pattern of attenuation, most prominent in the lung bases, felt to represent air trapping when correlated the prior study that included expiratory imaging, remains. Certainly subacute hypersensitivity pneumonitis could have this appearance, and imaging suggests some interval improvement. 2021 SPEP: no monoclonal bands. Increased polyclonal gamma globulins. 2022 CMP, TSH and CBC unremarkable. CRP 2022 0.4. HIV negative. IgA 310. IgE 77, IgG 4252, IgM 225. Hep C Ab screening negative. I ordered CBC, CMP, LDH, SPEP/CHELSY, serum light chain study, JEFF, RF and US of abdomen. If these remain inconclusive I will order labs as below.   JEFF, complement C3, complement C4, and ARI  Hepatitis B virus, hepatitis C virus, Will-Barr virus, human herpesvirus-8 serologies  Imaging for liver disease, lymphadenopathy, and malignancy  Bone marrow biopsy if lymphoma or hematological disorder suspected   lgG subclasses  Cytokine profile

## 2023-09-25 ENCOUNTER — HOSPITAL ENCOUNTER (OUTPATIENT)
Dept: INFUSION THERAPY | Age: 43
Discharge: HOME | End: 2023-09-25
Payer: COMMERCIAL

## 2023-09-25 DIAGNOSIS — R76.8 ELEVATED SERUM IMMUNOGLOBULIN FREE LIGHT CHAIN LEVEL: ICD-10-CM

## 2023-09-25 DIAGNOSIS — M35.9 CONNECTIVE TISSUE DISORDER (HCC): ICD-10-CM

## 2023-09-25 LAB
ALBUMIN SERPL-MCNC: 4 GM/DL (ref 3.4–5)
ALP BLD-CCNC: 89 IU/L (ref 40–129)
ALT SERPL-CCNC: 24 U/L (ref 10–40)
ANION GAP SERPL CALCULATED.3IONS-SCNC: 10 MMOL/L (ref 4–16)
AST SERPL-CCNC: 19 IU/L (ref 15–37)
BASOPHILS ABSOLUTE: 0.1 K/CU MM
BASOPHILS RELATIVE PERCENT: 0.6 % (ref 0–1)
BILIRUB SERPL-MCNC: 0.6 MG/DL (ref 0–1)
BUN SERPL-MCNC: 16 MG/DL (ref 6–23)
CALCIUM SERPL-MCNC: 9.7 MG/DL (ref 8.3–10.6)
CHLORIDE BLD-SCNC: 98 MMOL/L (ref 99–110)
CO2: 25 MMOL/L (ref 21–32)
CREAT SERPL-MCNC: 1 MG/DL (ref 0.9–1.3)
DIFFERENTIAL TYPE: ABNORMAL
EOSINOPHILS ABSOLUTE: 0.4 K/CU MM
EOSINOPHILS RELATIVE PERCENT: 4.1 % (ref 0–3)
ERYTHROCYTE SEDIMENTATION RATE: 41 MM/HR (ref 0–15)
GFR SERPL CREATININE-BSD FRML MDRD: >60 ML/MIN/1.73M2
GLUCOSE SERPL-MCNC: 90 MG/DL (ref 70–99)
HCT VFR BLD CALC: 46 % (ref 42–52)
HEMOGLOBIN: 15.9 GM/DL (ref 13.5–18)
LACTATE DEHYDROGENASE: 184 IU/L (ref 120–246)
LYMPHOCYTES ABSOLUTE: 1.7 K/CU MM
LYMPHOCYTES RELATIVE PERCENT: 18.8 % (ref 24–44)
MCH RBC QN AUTO: 31.3 PG (ref 27–31)
MCHC RBC AUTO-ENTMCNC: 34.6 % (ref 32–36)
MCV RBC AUTO: 90.6 FL (ref 78–100)
MONOCYTES ABSOLUTE: 0.5 K/CU MM
MONOCYTES RELATIVE PERCENT: 5.6 % (ref 0–4)
PDW BLD-RTO: 12.5 % (ref 11.7–14.9)
PLATELET # BLD: 278 K/CU MM (ref 140–440)
PMV BLD AUTO: 8.7 FL (ref 7.5–11.1)
POTASSIUM SERPL-SCNC: 4.2 MMOL/L (ref 3.5–5.1)
RBC # BLD: 5.08 M/CU MM (ref 4.6–6.2)
SEGMENTED NEUTROPHILS ABSOLUTE COUNT: 6.3 K/CU MM
SEGMENTED NEUTROPHILS RELATIVE PERCENT: 70.9 % (ref 36–66)
SODIUM BLD-SCNC: 133 MMOL/L (ref 135–145)
TOTAL PROTEIN: 9.1 GM/DL (ref 6.4–8.2)
WBC # BLD: 8.8 K/CU MM (ref 4–10.5)

## 2023-09-25 PROCEDURE — 36415 COLL VENOUS BLD VENIPUNCTURE: CPT

## 2023-09-25 PROCEDURE — 82232 ASSAY OF BETA-2 PROTEIN: CPT

## 2023-09-25 PROCEDURE — 84155 ASSAY OF PROTEIN SERUM: CPT

## 2023-09-25 PROCEDURE — 83615 LACTATE (LD) (LDH) ENZYME: CPT

## 2023-09-25 PROCEDURE — 84165 PROTEIN E-PHORESIS SERUM: CPT

## 2023-09-25 PROCEDURE — 85025 COMPLETE CBC W/AUTO DIFF WBC: CPT

## 2023-09-25 PROCEDURE — 80053 COMPREHEN METABOLIC PANEL: CPT

## 2023-09-25 PROCEDURE — 85652 RBC SED RATE AUTOMATED: CPT

## 2023-09-27 LAB — B2 MICROGLOB SERPL-MCNC: 2.3 MG/L (ref 0.8–2.4)

## 2023-09-28 LAB
ALBUMIN SERPL ELPH-MCNC: 3.7 GM/DL (ref 3.2–5.6)
ALPHA-1-GLOBULIN: 0.3 GM/DL (ref 0.1–0.4)
ALPHA-2-GLOBULIN: 0.7 GM/DL (ref 0.4–1.2)
BETA GLOBULIN: 1.1 GM/DL (ref 0.5–1.3)
GAMMA GLOBULIN: 3.4 GM/DL (ref 0.5–1.6)
SPEP INTERPRETATION: ABNORMAL
TOTAL PROTEIN: 9.1 GM/DL (ref 6.4–8.2)

## 2023-09-30 LAB
AVERAGE GLUCOSE: NORMAL
CHOLESTEROL, TOTAL: 186 MG/DL
CHOLESTEROL/HDL RATIO: NORMAL
HBA1C MFR BLD: 5.7 %
HDLC SERPL-MCNC: 37 MG/DL (ref 35–70)
LDL CHOLESTEROL CALCULATED: 114 MG/DL (ref 0–160)
NONHDLC SERPL-MCNC: NORMAL MG/DL
TRIGL SERPL-MCNC: 176 MG/DL
VLDLC SERPL CALC-MCNC: 35 MG/DL

## 2023-10-01 ENCOUNTER — HOSPITAL ENCOUNTER (OUTPATIENT)
Age: 43
Setting detail: SPECIMEN
Discharge: HOME | End: 2023-10-01
Payer: COMMERCIAL

## 2023-10-01 PROCEDURE — 83520 IMMUNOASSAY QUANT NOS NONAB: CPT

## 2023-10-01 PROCEDURE — 84156 ASSAY OF PROTEIN URINE: CPT

## 2023-10-01 PROCEDURE — 86335 IMMUNFIX E-PHORSIS/URINE/CSF: CPT

## 2023-10-01 PROCEDURE — 81050 URINALYSIS VOLUME MEASURE: CPT

## 2023-10-02 ENCOUNTER — HOSPITAL ENCOUNTER (OUTPATIENT)
Dept: INFUSION THERAPY | Age: 43
Discharge: HOME | End: 2023-10-02
Payer: COMMERCIAL

## 2023-10-02 ENCOUNTER — OFFICE VISIT (OUTPATIENT)
Dept: ONCOLOGY | Age: 43
End: 2023-10-02
Payer: COMMERCIAL

## 2023-10-02 VITALS
HEIGHT: 70 IN | OXYGEN SATURATION: 93 % | WEIGHT: 236.8 LBS | DIASTOLIC BLOOD PRESSURE: 80 MMHG | TEMPERATURE: 98.3 F | BODY MASS INDEX: 33.9 KG/M2 | HEART RATE: 98 BPM | SYSTOLIC BLOOD PRESSURE: 114 MMHG

## 2023-10-02 DIAGNOSIS — D89.2 HYPERGAMMAGLOBULINEMIA, UNSPECIFIED: ICD-10-CM

## 2023-10-02 DIAGNOSIS — M35.9 CONNECTIVE TISSUE DISORDER (HCC): Primary | ICD-10-CM

## 2023-10-02 DIAGNOSIS — R76.8 ELEVATED SERUM IMMUNOGLOBULIN FREE LIGHT CHAIN LEVEL: ICD-10-CM

## 2023-10-02 LAB
Lab: 24 HRS
VOLUME, (UVOL): 1650 MLS

## 2023-10-02 PROCEDURE — 99213 OFFICE O/P EST LOW 20 MIN: CPT | Performed by: INTERNAL MEDICINE

## 2023-10-02 PROCEDURE — 99211 OFF/OP EST MAY X REQ PHY/QHP: CPT

## 2023-10-02 ASSESSMENT — PATIENT HEALTH QUESTIONNAIRE - PHQ9
SUM OF ALL RESPONSES TO PHQ QUESTIONS 1-9: 0
SUM OF ALL RESPONSES TO PHQ9 QUESTIONS 1 & 2: 0
2. FEELING DOWN, DEPRESSED OR HOPELESS: 0
1. LITTLE INTEREST OR PLEASURE IN DOING THINGS: 0

## 2023-10-02 NOTE — PROGRESS NOTES
MA Rooming Questions  Patient: Latonia Duong  MRN: [id number]    Date: 10/2/2023        1. Do you have any new issues?   no         2. Do you need any refills on medications?    no    3. Have you had any imaging done since your last visit? yes - labs 9/25    4. Have you been hospitalized or seen in the emergency room since your last visit here?   no    5. Did the patient have a depression screening completed today?  Yes    PHQ-9 Total Score: 0 (10/2/2023  3:10 PM)       PHQ-9 Given to (if applicable):               PHQ-9 Score (if applicable):                     [] Positive     []  Negative              Does question #9 need addressed (if applicable)                     [] Yes    []  No               Rama Ferris CMA

## 2023-10-03 DIAGNOSIS — Z13.1 SCREENING FOR DIABETES MELLITUS: ICD-10-CM

## 2023-10-03 DIAGNOSIS — E78.5 DYSLIPIDEMIA: ICD-10-CM

## 2023-10-05 LAB
COLLECT DURATION TIME SPEC: 24 H
INTERPRETATION: ABNORMAL
KAPPA LC FREE 24H UR-MRATE: 74.89 MG/D
KAPPA LC FREE UR-MCNC: 45.39 MG/L (ref 0–32.9)
LAMBDA LC FREE 24H UR-MRATE: 3.61 MG/D
LAMBDA LC FREE UR-MCNC: 2.19 MG/L (ref 0–3.79)
PROT 24H UR-MRATE: 97 MG/D
SPECIMEN VOL ?TM UR: 1650 ML

## 2023-10-06 NOTE — PROGRESS NOTES
RHEUMATOLOGY FOLLOW-UP VISIT    10/9/2023      Patient Name: Ai Hinojosa  : 1980  Medical Record: [id number]      CHIEF COMPLAINT    MCTD   - Raynauds  - RF, CCP+  - RNP+   - Anti centromere +  - SSA+  Elevated IgG  Elevated rheumatoid factor  Elevated ESR    Pertinent problems  Prediabetes   HLD    HISTORY OF PRESENT ILLNESS    Ai Hinojosa is a 38 y.o. male who established on . Symptom onset began in  after he had covid and was treated for interstitial pneumonitis. W/u showed that IgG was elevated for which he was referred to Oncology. He underwent BAL for RML in 2021. That showed no Malignant Cells Are Identified, Alveolar Macrophage, Acute Inflammation and Few Eosinophils     [facility] 2021:   The groundglass attenuation seen previously has improved. The mosaic pattern of attenuation, most prominent in the lung bases, felt to represent air trapping when correlated the prior study that included expiratory imaging, remains. Certainly subacute hypersensitivity pneumonitis could have this appearance, and imaging suggests some interval improvement. 2021 SPEP: no monoclonal bands. Increased polyclonal gamma globulins. There is feet, neck, left lower back, hips and elbow pain on and off after prolonged standing and activity which he describes as chronic. Overall Discomfort:3-5/10 , worse in his neck which is the most consistent, then his left lower back. There is no night time or early morning back pain. Improved with: Activity helps back pain   Worse with: Movement worsens neck pain      Dry eyes and mouth: mild dry eyes   Photosensitivity: yes +   Joint Pains: +  Raynaud's: yes left index finger turns white  Anemia/thrombocytopenia/leukopenia: Lymphocytopenia   Denies smoking, social etoh, no recreational drug use. 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      LCV: 2023  Labs showed IgG+, SSA+, RNP+, RF+,

## 2023-10-09 ENCOUNTER — OFFICE VISIT (OUTPATIENT)
Dept: RHEUMATOLOGY | Age: 43
End: 2023-10-09
Payer: COMMERCIAL

## 2023-10-09 VITALS
WEIGHT: 236 LBS | BODY MASS INDEX: 33.86 KG/M2 | HEART RATE: 86 BPM | OXYGEN SATURATION: 95 % | DIASTOLIC BLOOD PRESSURE: 80 MMHG | SYSTOLIC BLOOD PRESSURE: 115 MMHG

## 2023-10-09 DIAGNOSIS — R76.8 RHEUMATOID FACTOR POSITIVE: ICD-10-CM

## 2023-10-09 DIAGNOSIS — M35.1 MCTD (MIXED CONNECTIVE TISSUE DISEASE) (HCC): Primary | ICD-10-CM

## 2023-10-09 DIAGNOSIS — R79.82 ELEVATED C-REACTIVE PROTEIN (CRP): ICD-10-CM

## 2023-10-09 DIAGNOSIS — M35.05 SJOGREN SYNDROME WITH INFLAMMATORY ARTHRITIS (HCC): ICD-10-CM

## 2023-10-09 DIAGNOSIS — R70.0 ELEVATED ERYTHROCYTE SEDIMENTATION RATE: ICD-10-CM

## 2023-10-09 PROCEDURE — 99214 OFFICE O/P EST MOD 30 MIN: CPT | Performed by: STUDENT IN AN ORGANIZED HEALTH CARE EDUCATION/TRAINING PROGRAM

## 2023-10-18 ENCOUNTER — TELEPHONE (OUTPATIENT)
Dept: RHEUMATOLOGY | Age: 43
End: 2023-10-18

## 2023-10-18 NOTE — TELEPHONE ENCOUNTER
----- Message from Lisa Richter MD sent at 10/18/2023 10:28 AM EDT -----  Please notify patient that I have received his repeat [facility] lupus labs. Some antibodies that are previously positive and now negative. The antibodies that are still positive are reducing in titer which is good. We will review his results together at his next visit.

## 2024-02-09 NOTE — PROGRESS NOTES
RHEUMATOLOGY FOLLOW-UP VISIT    2024      Patient Name: Pawan Hopkins  : 1980  Medical Record: [id number]      CHIEF COMPLAINT    MCTD   - Raynauds  - RF, CCP+  - RNP+   - Anti centromere +  - SSA+  Elevated IgG  Elevated rheumatoid factor  Elevated ESR    Pertinent problems  Prediabetes   HLD    HISTORY OF PRESENT ILLNESS    Pawan Hopkins is a 43 y.o. male who established on .  Symptom onset began in  after he had covid and was treated for interstitial pneumonitis. W/u showed that IgG was elevated for which he was referred to Oncology. He underwent BAL for RML in 2021. That showed no Malignant Cells Are Identified, Alveolar Macrophage, Acute Inflammation and Few Eosinophils     CT chest at [facility] 2021:   The groundglass attenuation seen previously has improved. The mosaic pattern of attenuation, most prominent in the lung bases, felt to represent air trapping when correlated the prior study that included expiratory imaging, remains. Certainly subacute hypersensitivity pneumonitis could have this appearance, and imaging suggests some interval improvement.      2021 SPEP: no monoclonal bands. Increased polyclonal gamma globulins.    There is feet, neck, left lower back, hips and elbow pain on and off after prolonged standing and activity which he describes as chronic.  Overall Discomfort:3-5/10 , worse in his neck which is the most consistent, then his left lower back. There is no night time or early morning back pain.  Improved with: Activity helps back pain   Worse with: Movement worsens neck pain      Dry eyes and mouth: mild dry eyes   Photosensitivity: yes +   Joint Pains: +  Raynaud's: yes left index finger turns white  Anemia/thrombocytopenia/leukopenia: Lymphocytopenia   Denies smoking, social etoh, no recreational drug use. 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     LCV: 10/9/2023  JEFF negative  Anti-dsDNA by ronal

## 2024-02-12 ENCOUNTER — OFFICE VISIT (OUTPATIENT)
Dept: RHEUMATOLOGY | Age: 44
End: 2024-02-12
Payer: COMMERCIAL

## 2024-02-12 ENCOUNTER — HOSPITAL ENCOUNTER (OUTPATIENT)
Age: 44
Discharge: HOME | End: 2024-02-12
Payer: COMMERCIAL

## 2024-02-12 VITALS
DIASTOLIC BLOOD PRESSURE: 80 MMHG | HEART RATE: 98 BPM | SYSTOLIC BLOOD PRESSURE: 125 MMHG | OXYGEN SATURATION: 94 % | WEIGHT: 237 LBS | BODY MASS INDEX: 34.01 KG/M2

## 2024-02-12 DIAGNOSIS — R76.8 RHEUMATOID FACTOR POSITIVE: ICD-10-CM

## 2024-02-12 DIAGNOSIS — R70.0 ELEVATED ERYTHROCYTE SEDIMENTATION RATE: ICD-10-CM

## 2024-02-12 DIAGNOSIS — R89.4 SEROLOGIC ABNORMALITY: ICD-10-CM

## 2024-02-12 DIAGNOSIS — M35.1 MCTD (MIXED CONNECTIVE TISSUE DISEASE) (HCC): Primary | ICD-10-CM

## 2024-02-12 LAB — ERYTHROCYTE SEDIMENTATION RATE: 5 MM/HR (ref 0–15)

## 2024-02-12 PROCEDURE — 85652 RBC SED RATE AUTOMATED: CPT

## 2024-02-12 PROCEDURE — 86430 RHEUMATOID FACTOR TEST QUAL: CPT

## 2024-02-12 PROCEDURE — 99214 OFFICE O/P EST MOD 30 MIN: CPT | Performed by: STUDENT IN AN ORGANIZED HEALTH CARE EDUCATION/TRAINING PROGRAM

## 2024-02-12 PROCEDURE — 36415 COLL VENOUS BLD VENIPUNCTURE: CPT

## 2024-02-15 LAB — RHEUMATOID FACTOR: 36 IU/ML (ref 0–14)

## 2024-03-11 ENCOUNTER — TELEPHONE (OUTPATIENT)
Dept: RHEUMATOLOGY | Age: 44
End: 2024-03-11

## 2024-03-11 NOTE — TELEPHONE ENCOUNTER
Pt called in requesting results on the test Sed rate and RA factor.  Pt was previously advised on the [employer] labs but was not advised on the above results.

## 2024-03-25 ENCOUNTER — OFFICE VISIT (OUTPATIENT)
Dept: INTERNAL MEDICINE CLINIC | Age: 44
End: 2024-03-25
Payer: COMMERCIAL

## 2024-03-25 VITALS
HEART RATE: 100 BPM | WEIGHT: 271 LBS | BODY MASS INDEX: 38.88 KG/M2 | OXYGEN SATURATION: 93 % | SYSTOLIC BLOOD PRESSURE: 120 MMHG | DIASTOLIC BLOOD PRESSURE: 82 MMHG

## 2024-03-25 DIAGNOSIS — B35.4 TINEA CORPORIS: Primary | ICD-10-CM

## 2024-03-25 DIAGNOSIS — R63.5 WEIGHT GAIN: ICD-10-CM

## 2024-03-25 DIAGNOSIS — R73.03 PREDIABETES: ICD-10-CM

## 2024-03-25 PROCEDURE — 99213 OFFICE O/P EST LOW 20 MIN: CPT | Performed by: FAMILY MEDICINE

## 2024-03-25 ASSESSMENT — ENCOUNTER SYMPTOMS
SHORTNESS OF BREATH: 0
ABDOMINAL PAIN: 0
COUGH: 0
NAUSEA: 0

## 2024-03-25 NOTE — PROGRESS NOTES
Pawan Hopkins (:  1980) is a 43 y.o. male,established patient, here for evaluation of the following chief complaint(s):  Skin Problem (Under armpit-appeared about 1 wk ago)         ASSESSMENT/PLAN:  1. Tinea corporis, R axilla  Start:  - ciclopirox (LOPROX) 0.77 % cream; Apply topically 2 times daily to affected area as directed  Dispense: 30 g; Refill: 0  ADR's explained    2. Prediabetes  The patient is asked to make an attempt to improve diet      3. Weight gain  The patient is asked to make an attempt to improve diet and exercise patterns     Return if symptoms worsen or fail to improve.       Lab Results   Component Value Date    WBC 8.8 2023    HGB 15.9 2023    HCT 46.0 2023    MCV 90.6 2023     2023       Lab Results   Component Value Date    LABA1C 5.7 2023       Lab Results   Component Value Date     (L) 2023    K 4.2 2023    CL 98 (L) 2023    CO2 25 2023    BUN 16 2023    CREATININE 1.0 2023    GLUCOSE 90 2023    CALCIUM 9.7 2023    PROT 9.1 (H) 2023    PROT 9.1 (H) 2023    LABALBU 4.0 2023    BILITOT 0.6 2023    ALKPHOS 89 2023    AST 19 2023    ALT 24 2023    LABGLOM >60 2023    GFRAA >60 2021       Subjective   SUBJECTIVE/OBJECTIVE:    HISTORY OF PRESENT ILLNESS:  This is a 43 y.o. male here for the following:  Patient Active Problem List    Diagnosis Date Noted    Prediabetes 2022    Dyslipidemia 2022    Hypersensitivity pneumonia (HCC) 2022    Sjogren syndrome with inflammatory arthritis ([facility]) 2023    MCTD (mixed connective tissue disease) ([facility]) 2023        He noticed a ring shaped area in his R armpit for 1 week and it is slightly itchy.   He has been in contact with his moms cat. He has been cleaning the litter box.  Prediabetes- he has gained weight. He will try to change his diet  Hypergammaglobulinemia-

## 2024-04-03 LAB
A/G RATIO: 0.8 RATIO (ref 0.8–2.6)
ADD ON: NORMAL
ALBUMIN SERPL-MCNC: 4.1 G/DL (ref 3.5–5.2)
ALP BLD-CCNC: 88 U/L (ref 23–144)
ALT SERPL-CCNC: 29 U/L (ref 0–60)
AST SERPL-CCNC: 25 U/L (ref 0–55)
BASOPHILS ABSOLUTE: 0.1 K/UL (ref 0–0.3)
BASOPHILS RELATIVE PERCENT: 1.1 % (ref 0–2)
BETA-2 MICROGLOBULIN: 2.2 MG/L
BILIRUB SERPL-MCNC: 0.8 MG/DL (ref 0–1.2)
BUN / CREAT RATIO: 25 (ref 7–25)
BUN BLDV-MCNC: 20 MG/DL (ref 3–29)
CALCIUM SERPL-MCNC: 9.2 MG/DL (ref 8.5–10.5)
CHLORIDE BLD-SCNC: 99 MEQ/L (ref 96–110)
CO2: 25 MEQ/L (ref 19–32)
CREAT SERPL-MCNC: 0.8 MG/DL (ref 0.5–1.2)
DIFFERENTIAL COUNT: ABNORMAL
EOSINOPHILS ABSOLUTE: 0.5 K/UL (ref 0–0.5)
EOSINOPHILS RELATIVE PERCENT: 8.5 % (ref 0–5)
ERYTHROCYTE SEDIMENTATION RATE: 66 MM/HR (ref 0–15)
ESTIMATED GLOMERULAR FILTRATION RATE CREATININE EQUATION: 111 MLS/MIN/1.73M2
FASTING STATUS: ABNORMAL
GLOBULIN: 5.3 G/DL (ref 1.9–3.6)
GLUCOSE BLD-MCNC: 85 MG/DL (ref 70–99)
HCT VFR BLD CALC: 45.7 % (ref 37.5–51)
HEMOGLOBIN: 15.7 G/DL (ref 13–17.7)
IGG,SERUM: 3866 MG/DL (ref 694–1618)
IGM,SERUM: 185 MG/DL (ref 48–271)
IMMATURE GRANS (ABS): 0 K/UL (ref 0–0.1)
IMMATURE GRANULOCYTES %: 0.2 %
IMMUNOGLOBULIN A, SERUM: 297 MG/DL (ref 81–463)
KAPPA FREE LIGHT CHAINS QNT: 56.8 MG/L (ref 3.3–19.4)
LACTATE DEHYDROGENASE: 253 U/L (ref 0–260)
LAMBDA FREE LIGHT CHAINS QNT: 34.3 MG/L (ref 5.71–26.3)
LYMPHOCYTES ABSOLUTE: 1.6 K/UL (ref 0.9–4.1)
LYMPHOCYTES RELATIVE PERCENT: 28.2 % (ref 14–51)
Lab: NORMAL
MCH RBC QN AUTO: 31 PG (ref 26–34)
MCHC RBC AUTO-ENTMCNC: 34.4 G/DL (ref 30.7–35.5)
MCV RBC AUTO: 90.1 FL (ref 80–100)
MONOCYTES ABSOLUTE: 0.5 K/UL (ref 0.2–1)
MONOCYTES RELATIVE PERCENT: 8.9 % (ref 4–12)
NEUTROPHILS ABSOLUTE: 3 K/UL (ref 1.8–7.5)
ORIGINAL ACCESSION NUMBER: NORMAL
PDW BLD-RTO: 12.6 %
PLATELET # BLD: 288 K/UL (ref 140–400)
PMV BLD AUTO: 9.5 FL (ref 7.2–11.7)
POTASSIUM SERPL-SCNC: 5 MEQ/L (ref 3.4–5.3)
PROTEIN PATTERN: NORMAL
RBC # BLD: 5.07 M/UL (ref 4.14–5.8)
RETICULOCYTE ABSOLUTE COUNT: 0 /100 WBC
SEGMENTED NEUTROPHILS RELATIVE PERCENT: 53.1 % (ref 42–80)
SODIUM BLD-SCNC: 134 MEQ/L (ref 135–148)
TOTAL PROTEIN: 9.4 G/DL (ref 6–8.3)
WBC # BLD: 5.6 K/UL (ref 3.5–10.9)

## 2024-04-05 ENCOUNTER — OFFICE VISIT (OUTPATIENT)
Dept: ONCOLOGY | Age: 44
End: 2024-04-05
Payer: COMMERCIAL

## 2024-04-05 ENCOUNTER — HOSPITAL ENCOUNTER (OUTPATIENT)
Dept: INFUSION THERAPY | Age: 44
Discharge: HOME | End: 2024-04-05
Payer: COMMERCIAL

## 2024-04-05 VITALS
HEIGHT: 70 IN | WEIGHT: 269 LBS | SYSTOLIC BLOOD PRESSURE: 131 MMHG | RESPIRATION RATE: 18 BRPM | HEART RATE: 94 BPM | BODY MASS INDEX: 38.51 KG/M2 | DIASTOLIC BLOOD PRESSURE: 76 MMHG | OXYGEN SATURATION: 95 % | TEMPERATURE: 97.8 F

## 2024-04-05 DIAGNOSIS — E66.01 SEVERE OBESITY (BMI 35.0-39.9) WITH COMORBIDITY (HCC): ICD-10-CM

## 2024-04-05 DIAGNOSIS — R77.8 ELEVATED TOTAL PROTEIN: Primary | ICD-10-CM

## 2024-04-05 PROCEDURE — 99213 OFFICE O/P EST LOW 20 MIN: CPT | Performed by: INTERNAL MEDICINE

## 2024-04-05 PROCEDURE — 99211 OFF/OP EST MAY X REQ PHY/QHP: CPT

## 2024-04-05 ASSESSMENT — PATIENT HEALTH QUESTIONNAIRE - PHQ9
1. LITTLE INTEREST OR PLEASURE IN DOING THINGS: NOT AT ALL
SUM OF ALL RESPONSES TO PHQ9 QUESTIONS 1 & 2: 0
SUM OF ALL RESPONSES TO PHQ QUESTIONS 1-9: 0
2. FEELING DOWN, DEPRESSED OR HOPELESS: NOT AT ALL
SUM OF ALL RESPONSES TO PHQ QUESTIONS 1-9: 0

## 2024-04-05 NOTE — PROGRESS NOTES
MA Rooming Questions  Patient: Pawan Hopkins  MRN: [id number]    Date: 4/5/2024        1. Do you have any new issues?   no         2. Do you need any refills on medications?    no    3. Have you had any imaging done since your last visit?   no    4. Have you been hospitalized or seen in the emergency room since your last visit here?   no    5. Did the patient have a depression screening completed today? Yes    No data recorded     PHQ-9 Given to (if applicable):               PHQ-9 Score (if applicable):                     [] Positive     []  Negative              Does question #9 need addressed (if applicable)                     [] Yes    []  No               Ilana Shaver MA      
and conjunctiva normal  ENT: Normocephalic, without obvious abnormality, atraumatic  NECK: supple, symmetrical, no jugular venous distension and no carotid bruits   HEMATOLOGIC/LYMPHATIC: no cervical, supraclavicular or axillary lymphadenopathy   LUNGS: no increased work of breathing and clear to auscultation bilaterally.  CARDIOVASCULAR: regular rate and rhythm, normal S1 and S2, no murmur  ABDOMEN: normal bowel sound, soft, non-distended, non-tender, no organomegaly.  MUSCULOSKELETAL: full range of motion noted, tone is normal  NEUROLOGIC: Motor skills grossly intact. CN II - XII grossly intact.  SKIN: No jaundice. appears intact   EXTREMITIES: no LE edema. No cyanosis. Homans negative.     Labs:  Hematology:  Lab Results   Component Value Date    WBC 8.8 09/25/2023    RBC 5.08 09/25/2023    HGB 15.9 09/25/2023    HCT 46.0 09/25/2023    MCV 90.6 09/25/2023    MCH 31.3 (H) 09/25/2023    MCHC 34.6 09/25/2023    RDW 12.5 09/25/2023     09/25/2023    MPV 8.7 09/25/2023    SEGSPCT 70.9 (H) 09/25/2023    EOSRELPCT 4.1 (H) 09/25/2023    BASOPCT 0.6 09/25/2023    LYMPHOPCT 18.8 (L) 09/25/2023    MONOPCT 5.6 (H) 09/25/2023    SEGSABS 6.3 09/25/2023    EOSABS 0.4 09/25/2023    BASOSABS 0.1 09/25/2023    LYMPHSABS 1.7 09/25/2023    MONOSABS 0.5 09/25/2023    DIFFTYPE AUTOMATED DIFFERENTIAL 09/25/2023     Lab Results   Component Value Date    ESR 5 02/12/2024     Chemistry:  Lab Results   Component Value Date     (L) 09/25/2023    K 4.2 09/25/2023    CL 98 (L) 09/25/2023    CO2 25 09/25/2023    BUN 16 09/25/2023    CREATININE 1.0 09/25/2023    GLUCOSE 90 09/25/2023    CALCIUM 9.7 09/25/2023    PROT 9.1 (H) 09/25/2023    PROT 9.1 (H) 09/25/2023    LABALBU 4.0 09/25/2023    BILITOT 0.6 09/25/2023    ALKPHOS 89 09/25/2023    AST 19 09/25/2023    ALT 24 09/25/2023    LABGLOM >60 09/25/2023    GFRAA >60 06/26/2021     Immunology:  Lab Results   Component Value Date    PROT 9.1 (H) 09/25/2023    PROT 9.1 (H) 09/25/2023

## 2024-04-11 ENCOUNTER — TELEPHONE (OUTPATIENT)
Dept: ONCOLOGY | Age: 44
End: 2024-04-11

## 2024-04-11 NOTE — TELEPHONE ENCOUNTER
Patient called given time and prep for BMB to be done on 4/30/24 [facility] arrival at 9 AM.

## 2024-04-16 NOTE — PROGRESS NOTES
Patient Name:  Pawan Hopkins  Patient :  1980  Patient MRN:  [id number]     Primary Oncologist: Eugenia Ball MD  Referring Provider: Narcisa Allen DO     Date of Service: 2024       Chief Complaint:    Chief Complaint   Patient presents with    Follow-up    Results      He came in for follow up visit.    Patient Active Problem List:     Prediabetes     Dyslipidemia     Hypersensitivity pneumonia     HPI:   Pawan Hopkins is a pleasant 43 year old male patient who was referred for evaluation of increased immunoglobulin.  He saw pulmonologist at [facility] for post COVID syndrome and  nonspecific hypersensitivity pneumonitis 2021. He was treated with prednisone for 3 - 4 weeks with improvement of the symptoms. He has BAL for RML in 2021. He had COVID in .  FINAL DIAGNOSIS:  ? No Malignant Cells Are Identified  ? Alveolar Macrophages  ? Acute Inflammation, Few Eosinophils    CT chest at [facility] 2021: The groundglass attenuation seen previously has improved. The mosaic pattern of attenuation, most prominent in the lung bases, felt to represent air trapping when correlated the prior study that included expiratory imaging, remains. Certainly subacute hypersensitivity pneumonitis could have this appearance, and imaging suggests some interval improvement.     2021 SPEP: no monoclonal bands. Increased polyclonal gamma globulins.  2022 CMP, TSH and CBC unremarkable.    CRP 2022 0.4. HIV negative. IgA 310. IgE 77, IgG 4252, IgM 225. Hep C Ab screening negative.    I ordered CBC, CMP, LDH, SPEP/CHELSY, serum light chain study, JEFF, RF and US of abdomen.  If these remain inconclusive I will order labs as below.  JEFF, complement C3, complement C4, and ARI  Hepatitis B virus, hepatitis C virus, Will-Barr virus, human herpesvirus-8 serologies  Imaging for liver disease, lymphadenopathy, and malignancy  Bone marrow biopsy if lymphoma or hematological disorder suspected   lgG subclasses  Cytokine profile (IL-5,

## 2024-04-23 NOTE — PROGRESS NOTES
IR Procedure at [facility]:  Spoke with patient and he will arrive at 0900 at [facility] on 4/30/2024 for his procedure at 1030. Also went over below instructions and told patient to take his medications as scheduled.    NPO at Midnight     2.   Follow your directions as prescribed by the doctor for your procedure and medications.  3.   Consult your provider as to when to stop blood thinner  4.   Do not take any pain medication within 6 hours of your procedure  5.   Do not drink any alcoholic beverages or use any street drugs 24 hours before procedure.  6.   Please wear simple, loose fitting clothing to the hospital.  Do not bring valuables (money,             credit cards, checkbooks, etc.)     7.   If you  have a Living Will and Durable Power of  for Healthcare, please bring in a copy.  8.   Please bring picture ID,  insurance card, paperwork from the doctors office            (H & P, Consent,  & card for implantable devices).  9.   Report to the information desk on the ground floor.  10. Take a shower the night before or morning of your procedure, do not apply any lotion, oil or powder.  11. If you are going to be sedated for the procedure, you will need a responsible adult to drive you home.

## 2024-04-30 ENCOUNTER — HOSPITAL ENCOUNTER (OUTPATIENT)
Dept: INTERVENTIONAL RADIOLOGY/VASCULAR | Age: 44
Discharge: HOME | End: 2024-04-30
Payer: COMMERCIAL

## 2024-04-30 VITALS
SYSTOLIC BLOOD PRESSURE: 104 MMHG | DIASTOLIC BLOOD PRESSURE: 77 MMHG | TEMPERATURE: 97.6 F | HEART RATE: 72 BPM | BODY MASS INDEX: 37.1 KG/M2 | WEIGHT: 265 LBS | HEIGHT: 71 IN | OXYGEN SATURATION: 95 % | RESPIRATION RATE: 14 BRPM

## 2024-04-30 DIAGNOSIS — R77.8 ELEVATED TOTAL PROTEIN: ICD-10-CM

## 2024-04-30 LAB
APTT: 27.8 SECONDS (ref 25.1–37.1)
BASOPHILS ABSOLUTE: 0 K/CU MM
BASOPHILS RELATIVE PERCENT: 0.6 % (ref 0–1)
DIFFERENTIAL TYPE: ABNORMAL
EOSINOPHILS ABSOLUTE: 0.3 K/CU MM
EOSINOPHILS RELATIVE PERCENT: 6.1 % (ref 0–3)
HCT VFR BLD CALC: 45.7 % (ref 42–52)
HEMOGLOBIN: 15.6 GM/DL (ref 13.5–18)
IMMATURE NEUTROPHIL %: 0.2 % (ref 0–0.43)
INR BLD: 1 INDEX
LYMPHOCYTES ABSOLUTE: 1.1 K/CU MM
LYMPHOCYTES RELATIVE PERCENT: 21 % (ref 24–44)
MCH RBC QN AUTO: 31.3 PG (ref 27–31)
MCHC RBC AUTO-ENTMCNC: 34.1 % (ref 32–36)
MCV RBC AUTO: 91.6 FL (ref 78–100)
MONOCYTES ABSOLUTE: 0.5 K/CU MM
MONOCYTES RELATIVE PERCENT: 8.3 % (ref 0–4)
NEUTROPHILS RELATIVE PERCENT: 63.8 % (ref 36–66)
NUCLEATED RBC %: 0 %
PDW BLD-RTO: 12 % (ref 11.7–14.9)
PLATELET # BLD: 258 K/CU MM (ref 140–440)
PMV BLD AUTO: 8.6 FL (ref 7.5–11.1)
PROTHROMBIN TIME: 13.3 SECONDS (ref 11.7–14.5)
RBC # BLD: 4.99 M/CU MM (ref 4.6–6.2)
RETICULOCYTE COUNT PCT: 1.1 % (ref 0.2–2.2)
SEGMENTED NEUTROPHILS ABSOLUTE COUNT: 3.5 K/CU MM
TOTAL IMMATURE NEUTOROPHIL: 0.01 K/CU MM
TOTAL NUCLEATED RBC: 0 K/CU MM
WBC # BLD: 5.7 K/CU MM (ref 4–10.5)

## 2024-04-30 PROCEDURE — 85027 COMPLETE CBC AUTOMATED: CPT

## 2024-04-30 PROCEDURE — 2580000003 HC RX 258

## 2024-04-30 PROCEDURE — 77002 NEEDLE LOCALIZATION BY XRAY: CPT

## 2024-04-30 PROCEDURE — 85730 THROMBOPLASTIN TIME PARTIAL: CPT

## 2024-04-30 PROCEDURE — 85610 PROTHROMBIN TIME: CPT

## 2024-04-30 PROCEDURE — 99152 MOD SED SAME PHYS/QHP 5/>YRS: CPT

## 2024-04-30 PROCEDURE — 85045 AUTOMATED RETICULOCYTE COUNT: CPT

## 2024-04-30 PROCEDURE — 2500000003 HC RX 250 WO HCPCS: Performed by: RADIOLOGY

## 2024-04-30 PROCEDURE — 6360000002 HC RX W HCPCS: Performed by: RADIOLOGY

## 2024-04-30 PROCEDURE — 2500000003 HC RX 250 WO HCPCS

## 2024-04-30 PROCEDURE — 6360000002 HC RX W HCPCS

## 2024-04-30 PROCEDURE — 38222 DX BONE MARROW BX & ASPIR: CPT

## 2024-04-30 PROCEDURE — 2709999900 IR BIOPSY BONE MARROW

## 2024-04-30 RX ORDER — MIDAZOLAM HYDROCHLORIDE 2 MG/2ML
INJECTION, SOLUTION INTRAMUSCULAR; INTRAVENOUS PRN
Status: COMPLETED | OUTPATIENT
Start: 2024-04-30 | End: 2024-04-30

## 2024-04-30 RX ORDER — SODIUM CHLORIDE 0.9 % (FLUSH) 0.9 %
10 SYRINGE (ML) INJECTION PRN
Status: DISCONTINUED | OUTPATIENT
Start: 2024-04-30 | End: 2024-05-01 | Stop reason: HOSPADM

## 2024-04-30 RX ORDER — MIDAZOLAM HYDROCHLORIDE 5 MG/ML
INJECTION INTRAMUSCULAR; INTRAVENOUS PRN
Status: COMPLETED | OUTPATIENT
Start: 2024-04-30 | End: 2024-04-30

## 2024-04-30 RX ORDER — LIDOCAINE HYDROCHLORIDE 10 MG/ML
INJECTION, SOLUTION EPIDURAL; INFILTRATION; INTRACAUDAL; PERINEURAL PRN
Status: COMPLETED | OUTPATIENT
Start: 2024-04-30 | End: 2024-04-30

## 2024-04-30 RX ORDER — FENTANYL CITRATE 50 UG/ML
INJECTION, SOLUTION INTRAMUSCULAR; INTRAVENOUS PRN
Status: COMPLETED | OUTPATIENT
Start: 2024-04-30 | End: 2024-04-30

## 2024-04-30 RX ADMIN — FENTANYL CITRATE 50 MCG: 50 INJECTION, SOLUTION INTRAMUSCULAR; INTRAVENOUS at 10:55

## 2024-04-30 RX ADMIN — LIDOCAINE HYDROCHLORIDE 10 ML: 10 INJECTION, SOLUTION EPIDURAL; INFILTRATION; INTRACAUDAL; PERINEURAL at 10:53

## 2024-04-30 RX ADMIN — FENTANYL CITRATE 50 MCG: 50 INJECTION, SOLUTION INTRAMUSCULAR; INTRAVENOUS at 10:50

## 2024-04-30 RX ADMIN — MIDAZOLAM HYDROCHLORIDE 1 MG: 1 INJECTION, SOLUTION INTRAMUSCULAR; INTRAVENOUS at 10:51

## 2024-04-30 RX ADMIN — MIDAZOLAM 1 MG: 5 INJECTION INTRAMUSCULAR; INTRAVENOUS at 10:55

## 2024-04-30 NOTE — PRE SEDATION
Sedation Pre-Procedure Note    Patient Name: Pawan Hopkins   YOB: 1980  Room/Bed: Room/bed info not found  Medical Record Number: [id number]  Date: 4/30/2024   Time: 10:04 AM       Indication:  Bone Marrow Biopsy and Aspiration    Consent: I have discussed with the patient and/or the patient representative the indication, alternatives, and the possible risks and/or complications of the planned procedure and the anesthesia methods. The patient and/or patient representative appear to understand and agree to proceed.    Vital Signs:   Vitals:    04/30/24 0940   BP: (!) 128/90   Pulse: 81   Resp: 14   Temp: 97.6 °F (36.4 °C)   SpO2: 96%       Past Medical History:   has a past medical history of GERD (gastroesophageal reflux disease) and History of concussion.    Past Surgical History:   has a past surgical history that includes Buras tooth extraction.    Medications:   Scheduled Meds:   Continuous Infusions:   PRN Meds: sodium chloride flush  Home Meds:   Prior to Admission medications    Medication Sig Start Date End Date Taking? Authorizing Provider   ciclopirox (LOPROX) 0.77 % cream Apply topically 2 times daily to affected area as directed 3/25/24   Narcisa Allen DO   albuterol sulfate HFA (PROVENTIL;VENTOLIN;PROAIR) 108 (90 Base) MCG/ACT inhaler Inhale 2 puffs into the lungs every 6 hours as needed for Wheezing 6/2/23   Narcisa Allen DO   Multiple Vitamin (MULTIVITAMIN) capsule Take 1 capsule by mouth daily    Babs Wise MD   loratadine (CLARITIN) 10 MG tablet Take 1 tablet by mouth daily    Babs Wise MD     Coumadin Use Last 7 Days:  no  Antiplatelet drug therapy use last 7 days: no  Other anticoagulant use last 7 days: no  Additional Medication Information:  none      Pre-Sedation Documentation and Exam:   Vital signs have been reviewed (see flow sheet for vitals).    Mallampati Airway Assessment:  Mallampati Class II - (soft palate, fauces & uvula are visible)    Prior

## 2024-04-30 NOTE — PROGRESS NOTES
TRANSFER - OUT REPORT:    Verbal report given to Jessica juan david Hopkins being transferred to [facility] for routine progression of patient care       Report consisted of patient's Situation, Background, Assessment and   Recommendations(SBAR).     Information from the following report(s) Nurse Handoff Report was reviewed with the receiving nurse.    Opportunity for questions and clarification was provided.      Patient transported with:   Registered Nurse

## 2024-05-08 ENCOUNTER — OFFICE VISIT (OUTPATIENT)
Dept: ONCOLOGY | Age: 44
End: 2024-05-08
Payer: COMMERCIAL

## 2024-05-08 ENCOUNTER — HOSPITAL ENCOUNTER (OUTPATIENT)
Dept: INFUSION THERAPY | Age: 44
Discharge: HOME | End: 2024-05-08
Payer: COMMERCIAL

## 2024-05-08 VITALS
DIASTOLIC BLOOD PRESSURE: 79 MMHG | SYSTOLIC BLOOD PRESSURE: 129 MMHG | OXYGEN SATURATION: 94 % | WEIGHT: 266.4 LBS | BODY MASS INDEX: 37.3 KG/M2 | HEART RATE: 94 BPM | HEIGHT: 71 IN | TEMPERATURE: 98.8 F

## 2024-05-08 DIAGNOSIS — Z12.11 COLON CANCER SCREENING: Primary | ICD-10-CM

## 2024-05-08 PROCEDURE — 99213 OFFICE O/P EST LOW 20 MIN: CPT | Performed by: INTERNAL MEDICINE

## 2024-05-08 PROCEDURE — 99211 OFF/OP EST MAY X REQ PHY/QHP: CPT

## 2024-05-08 NOTE — PROGRESS NOTES
MA Rooming Questions  Patient: Pawan Hopkins  MRN: [id number]    Date: 5/8/2024        1. Do you have any new issues?   yes - Patient states is concerned regarding the test results.          2. Do you need any refills on medications?    no    3. Have you had any imaging done since your last visit?   no    4. Have you been hospitalized or seen in the emergency room since your last visit here?   no    5. Did the patient have a depression screening completed today? No    No data recorded     PHQ-9 Given to (if applicable):               PHQ-9 Score (if applicable):                     [] Positive     []  Negative              Does question #9 need addressed (if applicable)                     [] Yes    []  No               Suzy Ryan MA

## 2024-06-06 LAB — NONINV COLON CA DNA+OCC BLD SCRN STL QL: NEGATIVE

## 2024-06-07 ENCOUNTER — OFFICE VISIT (OUTPATIENT)
Dept: INTERNAL MEDICINE CLINIC | Age: 44
End: 2024-06-07
Payer: COMMERCIAL

## 2024-06-07 VITALS
SYSTOLIC BLOOD PRESSURE: 122 MMHG | BODY MASS INDEX: 36.9 KG/M2 | HEART RATE: 95 BPM | OXYGEN SATURATION: 95 % | HEIGHT: 71 IN | DIASTOLIC BLOOD PRESSURE: 64 MMHG | WEIGHT: 263.6 LBS

## 2024-06-07 DIAGNOSIS — M35.1 MCTD (MIXED CONNECTIVE TISSUE DISEASE) (HCC): ICD-10-CM

## 2024-06-07 DIAGNOSIS — R77.8 ELEVATED TOTAL PROTEIN: ICD-10-CM

## 2024-06-07 DIAGNOSIS — R76.8 INCREASED IMMUNOGLOBULIN: ICD-10-CM

## 2024-06-07 DIAGNOSIS — M35.05 SJOGREN SYNDROME WITH INFLAMMATORY ARTHRITIS (HCC): ICD-10-CM

## 2024-06-07 DIAGNOSIS — Z00.00 ANNUAL PHYSICAL EXAM: Primary | ICD-10-CM

## 2024-06-07 DIAGNOSIS — E78.5 DYSLIPIDEMIA: ICD-10-CM

## 2024-06-07 DIAGNOSIS — R73.03 PREDIABETES: ICD-10-CM

## 2024-06-07 PROBLEM — J67.9 HYPERSENSITIVITY PNEUMONIA (HCC): Status: RESOLVED | Noted: 2022-05-27 | Resolved: 2024-06-07

## 2024-06-07 PROCEDURE — 99396 PREV VISIT EST AGE 40-64: CPT | Performed by: FAMILY MEDICINE

## 2024-06-07 SDOH — ECONOMIC STABILITY: INCOME INSECURITY: HOW HARD IS IT FOR YOU TO PAY FOR THE VERY BASICS LIKE FOOD, HOUSING, MEDICAL CARE, AND HEATING?: NOT HARD AT ALL

## 2024-06-07 SDOH — ECONOMIC STABILITY: FOOD INSECURITY: WITHIN THE PAST 12 MONTHS, YOU WORRIED THAT YOUR FOOD WOULD RUN OUT BEFORE YOU GOT MONEY TO BUY MORE.: NEVER TRUE

## 2024-06-07 SDOH — ECONOMIC STABILITY: FOOD INSECURITY: WITHIN THE PAST 12 MONTHS, THE FOOD YOU BOUGHT JUST DIDN'T LAST AND YOU DIDN'T HAVE MONEY TO GET MORE.: NEVER TRUE

## 2024-06-07 ASSESSMENT — ENCOUNTER SYMPTOMS
COUGH: 0
NAUSEA: 0
SHORTNESS OF BREATH: 0
ABDOMINAL PAIN: 0
DIARRHEA: 0
BLOOD IN STOOL: 0
CONSTIPATION: 0
BACK PAIN: 0

## 2024-06-07 NOTE — PROGRESS NOTES
Completed    HIV screen  Completed    Hepatitis A vaccine  Aged Out    Hib vaccine  Aged Out    HPV vaccine  Aged Out    Polio vaccine  Aged Out    Meningococcal (ACWY) vaccine  Aged Out    Pneumococcal 0-64 years Vaccine  Aged Out    Diabetes screen  Discontinued     Recommendations for Preventive Services Due: see orders and patient instructions/AVS.    Return for follow up in 4 to 5 months for Elevated IG, protein.    This dictation was generated by voice recognition computer software.  Although all attempts are made to edit the dictation for accuracy, there may be errors in the transcription that are not intended.

## 2024-06-08 PROBLEM — R77.8 ELEVATED TOTAL PROTEIN: Status: ACTIVE | Noted: 2024-06-08

## 2024-06-08 PROBLEM — R76.8 INCREASED IMMUNOGLOBULIN: Status: ACTIVE | Noted: 2024-06-08

## 2024-06-21 LAB
CHROMOSOME, BONE MARROW: NORMAL
COMMENT: NORMAL
GDT REPLACEMENT: NORMAL

## 2024-07-27 LAB
CHOLESTEROL, TOTAL: 160 MG/DL
HBA1C MFR BLD: 5.6 %
HDLC SERPL-MCNC: 35 MG/DL
LDL CHOLESTEROL: 97 MG/DL
TRIGL SERPL-MCNC: 140 MG/DL
VLDLC SERPL CALC-MCNC: 28 MG/DL (ref 4–32)

## 2024-07-30 LAB
A/G RATIO: 0.9 (ref 1–3.3)
ALBUMIN ELP: 3.9 G/DL (ref 3.5–5.2)
ALPHA 2: 0.6 G/DL (ref 0.5–1.1)
ALPHA-1-GLOBULIN: 0.2 G/DL (ref 0.2–0.5)
BETA GLOBULIN: 0.9 G/DL (ref 0.6–1.1)
GAMMA GLOBULIN: 2.6 G/DL (ref 0.5–1.5)
PROTEIN PATTERN: ABNORMAL
TOTAL PROTEIN: 8.2 G/DL (ref 6–8.3)

## 2024-08-07 NOTE — PROGRESS NOTES
RHEUMATOLOGY FOLLOW-UP VISIT    2024      Patient Name: Pawan Hopkins  : 1980  Medical Record: [id number]      CHIEF COMPLAINT    MCTD   - Raynauds  - RF, CCP+  - RNP+   - Anti centromere +  - SSA+  Elevated IgG  Elevated rheumatoid factor  Elevated ESR    Pertinent problems  Prediabetes   HLD    HISTORY OF PRESENT ILLNESS    Pawan Hopkins is a 43 y.o. male who established on 23.  Symptom onset began in  after he had covid and was treated for interstitial pneumonitis. W/u showed that IgG was elevated for which he was referred to Oncology. He underwent BAL for RML in 2021. That showed no Malignant Cells Are Identified, Alveolar Macrophage, Acute Inflammation and Few Eosinophils     CT chest at [facility] 2021:   The groundglass attenuation seen previously has improved. The mosaic pattern of attenuation, most prominent in the lung bases, felt to represent air trapping when correlated the prior study that included expiratory imaging, remains. Certainly subacute hypersensitivity pneumonitis could have this appearance, and imaging suggests some interval improvement.      2021 SPEP: no monoclonal bands. Increased polyclonal gamma globulins.    There is feet, neck, left lower back, hips and elbow pain on and off after prolonged standing and activity which he describes as chronic.  Overall Discomfort:3-5/10 , worse in his neck which is the most consistent, then his left lower back. There is no night time or early morning back pain.  Improved with: Activity helps back pain   Worse with: Movement worsens neck pain      Dry eyes and mouth: mild dry eyes   Photosensitivity: yes +   Raynaud's: yes left index finger turns white  Anemia/thrombocytopenia/leukopenia: Lymphocytopenia   Denies smoking, social etoh, no recreational drug use. 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     LCV:   JEFF negative  Anti-dsDNA by ronal

## 2024-08-12 ENCOUNTER — OFFICE VISIT (OUTPATIENT)
Dept: RHEUMATOLOGY | Age: 44
End: 2024-08-12
Payer: COMMERCIAL

## 2024-08-12 VITALS
WEIGHT: 267.6 LBS | BODY MASS INDEX: 37.46 KG/M2 | OXYGEN SATURATION: 99 % | SYSTOLIC BLOOD PRESSURE: 116 MMHG | DIASTOLIC BLOOD PRESSURE: 88 MMHG | HEART RATE: 85 BPM | HEIGHT: 71 IN | RESPIRATION RATE: 18 BRPM

## 2024-08-12 DIAGNOSIS — R79.82 ELEVATED C-REACTIVE PROTEIN (CRP): ICD-10-CM

## 2024-08-12 DIAGNOSIS — M35.1 MCTD (MIXED CONNECTIVE TISSUE DISEASE) (HCC): Primary | ICD-10-CM

## 2024-08-12 DIAGNOSIS — R76.8 RHEUMATOID FACTOR POSITIVE: ICD-10-CM

## 2024-08-12 DIAGNOSIS — M35.05 SJOGREN SYNDROME WITH INFLAMMATORY ARTHRITIS (HCC): ICD-10-CM

## 2024-08-12 PROCEDURE — 99214 OFFICE O/P EST MOD 30 MIN: CPT | Performed by: STUDENT IN AN ORGANIZED HEALTH CARE EDUCATION/TRAINING PROGRAM

## 2024-08-12 NOTE — PATIENT INSTRUCTIONS
Get labs today   RTC in 6 months  Please call and notify clinic if you have any symptoms of joint pain/ stiffness/ swelling,  rash or shortness of breath.

## 2024-10-25 ENCOUNTER — OFFICE VISIT (OUTPATIENT)
Dept: INTERNAL MEDICINE CLINIC | Age: 44
End: 2024-10-25
Payer: COMMERCIAL

## 2024-10-25 VITALS
WEIGHT: 261.4 LBS | SYSTOLIC BLOOD PRESSURE: 128 MMHG | HEIGHT: 71 IN | BODY MASS INDEX: 36.6 KG/M2 | RESPIRATION RATE: 16 BRPM | OXYGEN SATURATION: 96 % | DIASTOLIC BLOOD PRESSURE: 70 MMHG | HEART RATE: 90 BPM

## 2024-10-25 DIAGNOSIS — R76.8 INCREASED IMMUNOGLOBULIN: ICD-10-CM

## 2024-10-25 DIAGNOSIS — E78.5 DYSLIPIDEMIA: ICD-10-CM

## 2024-10-25 DIAGNOSIS — L98.9 LESION OF SKIN OF NOSE: Primary | ICD-10-CM

## 2024-10-25 DIAGNOSIS — R77.8 ELEVATED TOTAL PROTEIN: ICD-10-CM

## 2024-10-25 DIAGNOSIS — M35.1 MCTD (MIXED CONNECTIVE TISSUE DISEASE) (HCC): ICD-10-CM

## 2024-10-25 PROCEDURE — 99214 OFFICE O/P EST MOD 30 MIN: CPT | Performed by: FAMILY MEDICINE

## 2024-10-25 ASSESSMENT — ENCOUNTER SYMPTOMS
COUGH: 0
ABDOMINAL PAIN: 0
SHORTNESS OF BREATH: 0
NAUSEA: 0

## 2024-10-25 NOTE — PROGRESS NOTES
Pawan Hopkins (:  1980) is a 43 y.o. male,established patient, here for evaluation of the following chief complaint(s):  Follow-up (4-5 follow up), Skin Problem (Concerned with spot located on the nose ), and Hyperlipidemia      Assessment & Plan   ASSESSMENT/PLAN:    Assessment & Plan  1. Lesion of skin of nose.  He will call back with the name of the specialist that he would like to be referred to for evaluation    2. Elevated total protein.  A serum protein electrophoresis has been ordered.   A kappa/lambda quantitative free light chain serum has been ordered.    3. Increased immunoglobulin.  Electrophoresis protein serum has been ordered.   A kappa/lambda quantitative free light chain serum has been ordered.    4. Dyslipidemia.  Dietary changes recommended. Medications reconciled and discussed.    5. MCTZ (mixed connective tissue disease)   Stable. Follow up with rheumatology if needed.    Medications reconciled and discussed with the patient  On this date, 10/25/24, I spent 30 minutes  reviewing  previous notes and test results as well as a face to face encounter discussing diagnosis and treatment plan, physical exam, and documenting the day of the visit  Return in about 6 months (around 2025) for Elevated gamma globulin/protein.       Lab Results   Component Value Date    WBC 5.7 2024    HGB 15.6 2024    HCT 45.7 2024    MCV 91.6 2024     2024     Lab Results   Component Value Date    CHOL 160 2024     Lab Results   Component Value Date    TRIG 140 2024     Lab Results   Component Value Date    HDL 35 (L) 2024     Lab Results   Component Value Date    LDL 97 2024    LDLDIRECT 115 (H) 2021       Lab Results   Component Value Date    LABA1C 5.6 2024     Lab Results   Component Value Date     2021     Lab Results   Component Value Date     (L) 2024    K 5.0 2024    CL 99 2024    CO2 25

## 2024-12-27 ENCOUNTER — HOSPITAL ENCOUNTER (OUTPATIENT)
Age: 44
Discharge: HOME | End: 2024-12-27
Payer: COMMERCIAL

## 2024-12-27 DIAGNOSIS — R77.8 ELEVATED TOTAL PROTEIN: ICD-10-CM

## 2024-12-27 DIAGNOSIS — R76.8 INCREASED IMMUNOGLOBULIN: ICD-10-CM

## 2024-12-27 LAB
ALBUMIN PERCENT: NORMAL %
ALBUMIN SERPL-MCNC: NORMAL G/DL
ALPHA 2 PERCENT: NORMAL %
ALPHA1 GLOB SERPL ELPH-MCNC: NORMAL %
ALPHA1 GLOB SERPL ELPH-MCNC: NORMAL G/DL
ALPHA2 GLOB SERPL ELPH-MCNC: NORMAL G/DL
B-GLOBULIN SERPL ELPH-MCNC: NORMAL %
B-GLOBULIN SERPL ELPH-MCNC: NORMAL G/DL
GAMMA GLOB SERPL ELPH-MCNC: NORMAL G/DL
GAMMA GLOBULIN %: NORMAL %
M PROTEIN 2 SERPL ELPH-MCNC: NORMAL G/DL
M PROTEIN SERPL ELPH-MCNC: NORMAL G/DL
PATHOLOGIST: NORMAL
PROT PATTERN SERPL ELPH-IMP: NORMAL
PROT SERPL-MCNC: 8 G/DL
TOTAL PROT. SUM,%: NORMAL %
TOTAL PROT. SUM: NORMAL G/DL

## 2024-12-27 PROCEDURE — 84155 ASSAY OF PROTEIN SERUM: CPT

## 2024-12-27 PROCEDURE — 84165 PROTEIN E-PHORESIS SERUM: CPT

## 2024-12-27 PROCEDURE — 83521 IG LIGHT CHAINS FREE EACH: CPT

## 2024-12-28 LAB
COMMENT: NORMAL
KAPPA FREE LIGHT CHAIN: NORMAL
KAPPA/LAMBDA RATIO: NORMAL
LAMBDA FREE LIGHT CHAIN: NORMAL

## 2025-01-02 LAB
COMMENT: ABNORMAL
KAPPA FREE LIGHT CHAIN: 40.6 MG/L (ref 2.37–20.73)
KAPPA/LAMBDA RATIO: 1.75 (ref 0.22–1.74)
LAMBDA FREE LIGHT CHAIN: 23.2 MG/L (ref 4.23–27.69)

## 2025-01-05 LAB
MISCELLANEOUS LAB TEST RESULT: ABNORMAL
TEST NAME: ABNORMAL

## 2025-02-08 NOTE — PROGRESS NOTES
RHEUMATOLOGY FOLLOW-UP VISIT    2025      Patient Name: Pawan Hopkins  : 1980  Medical Record: [id number]      CHIEF COMPLAINT    MCTD   - Raynauds  - RF, CCP+  - RNP+   - Anti centromere +  - SSA+  Elevated IgG  Elevated rheumatoid factor  Elevated ESR    Pertinent problems  Prediabetes   HLD    HISTORY OF PRESENT ILLNESS    Pawan Hopkins is a 44 y.o. male who established on 23.  Symptom onset began in  after he had covid and was treated for interstitial pneumonitis. W/u showed that IgG was elevated for which he was referred to Oncology. He underwent BAL for RML in 2021. That showed no Malignant Cells Are Identified, Alveolar Macrophage, Acute Inflammation and Few Eosinophils     CT chest at [facility] 2021:   The groundglass attenuation seen previously has improved. The mosaic pattern of attenuation, most prominent in the lung bases, felt to represent air trapping when correlated the prior study that included expiratory imaging, remains. Certainly subacute hypersensitivity pneumonitis could have this appearance, and imaging suggests some interval improvement.      2021 SPEP: no monoclonal bands. Increased polyclonal gamma globulins.    There is feet, neck, left lower back, hips and elbow pain on and off after prolonged standing and activity which he describes as chronic.  Overall Discomfort:3-5/10 , worse in his neck which is the most consistent, then his left lower back. There is no night time or early morning back pain.  Improved with: Activity helps back pain   Worse with: Movement worsens neck pain      Dry eyes and mouth: mild dry eyes   Photosensitivity: yes +   Raynaud's: yes left index finger turns white  Anemia/thrombocytopenia/leukopenia: Lymphocytopenia   Denies smoking, social etoh, no recreational drug use. 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     LCV:   JEFF negative  Anti-dsDNA by ronal

## 2025-02-12 ENCOUNTER — OFFICE VISIT (OUTPATIENT)
Age: 45
End: 2025-02-12
Payer: COMMERCIAL

## 2025-02-12 ENCOUNTER — HOSPITAL ENCOUNTER (OUTPATIENT)
Age: 45
Discharge: HOME | End: 2025-02-12
Payer: COMMERCIAL

## 2025-02-12 VITALS
SYSTOLIC BLOOD PRESSURE: 122 MMHG | WEIGHT: 271.8 LBS | HEART RATE: 83 BPM | OXYGEN SATURATION: 96 % | DIASTOLIC BLOOD PRESSURE: 88 MMHG | BODY MASS INDEX: 37.91 KG/M2

## 2025-02-12 DIAGNOSIS — R76.8 RHEUMATOID FACTOR POSITIVE: ICD-10-CM

## 2025-02-12 DIAGNOSIS — R76.8 RHEUMATOID FACTOR POSITIVE: Primary | ICD-10-CM

## 2025-02-12 PROCEDURE — 86431 RHEUMATOID FACTOR QUANT: CPT

## 2025-02-12 PROCEDURE — 99214 OFFICE O/P EST MOD 30 MIN: CPT | Performed by: STUDENT IN AN ORGANIZED HEALTH CARE EDUCATION/TRAINING PROGRAM

## 2025-02-12 NOTE — PATIENT INSTRUCTIONS
We are committed to providing you the best care possible.    If you receive a survey after visiting one of our offices, please take time to share your experience concerning your physician office visit.  These surveys are confidential and no health information about you is shared.    We are eager to improve for you and we are counting on your feedback to help make that happen.       
English

## 2025-02-13 LAB — RHEUMATOID FACTOR: 13.7 IU/ML

## 2025-03-25 ENCOUNTER — OFFICE VISIT (OUTPATIENT)
Dept: INTERNAL MEDICINE CLINIC | Age: 45
End: 2025-03-25
Payer: COMMERCIAL

## 2025-03-25 VITALS
BODY MASS INDEX: 38.08 KG/M2 | DIASTOLIC BLOOD PRESSURE: 80 MMHG | HEART RATE: 82 BPM | SYSTOLIC BLOOD PRESSURE: 124 MMHG | OXYGEN SATURATION: 98 % | WEIGHT: 273 LBS

## 2025-03-25 DIAGNOSIS — L30.9 DERMATITIS: Primary | ICD-10-CM

## 2025-03-25 DIAGNOSIS — L98.9 LESION OF SKIN OF NOSE: ICD-10-CM

## 2025-03-25 PROCEDURE — 99213 OFFICE O/P EST LOW 20 MIN: CPT | Performed by: FAMILY MEDICINE

## 2025-03-25 SDOH — ECONOMIC STABILITY: FOOD INSECURITY: WITHIN THE PAST 12 MONTHS, THE FOOD YOU BOUGHT JUST DIDN'T LAST AND YOU DIDN'T HAVE MONEY TO GET MORE.: NEVER TRUE

## 2025-03-25 SDOH — ECONOMIC STABILITY: FOOD INSECURITY: WITHIN THE PAST 12 MONTHS, YOU WORRIED THAT YOUR FOOD WOULD RUN OUT BEFORE YOU GOT MONEY TO BUY MORE.: NEVER TRUE

## 2025-03-25 ASSESSMENT — PATIENT HEALTH QUESTIONNAIRE - PHQ9
SUM OF ALL RESPONSES TO PHQ QUESTIONS 1-9: 0
2. FEELING DOWN, DEPRESSED OR HOPELESS: NOT AT ALL
1. LITTLE INTEREST OR PLEASURE IN DOING THINGS: NOT AT ALL
SUM OF ALL RESPONSES TO PHQ QUESTIONS 1-9: 0

## 2025-03-25 ASSESSMENT — ENCOUNTER SYMPTOMS
ABDOMINAL PAIN: 0
COUGH: 0
NAUSEA: 0
SHORTNESS OF BREATH: 0

## 2025-03-25 NOTE — PROGRESS NOTES
Pawan Hopkins (:  1980) is a 44 y.o. male,established patient, here for evaluation of the following chief complaint(s):  Rash (Under left eye, been about a couple of weeks. )      Assessment & Plan   ASSESSMENT/PLAN:    Assessment & Plan        1. Dermatitis, lower left eyelid  He will use hydrocortisone 1% sparingly to the area twice a day for  one week if needed  - External Referral To Dermatology    2. Lesion of skin of nose  He is interested in having and evaluation of the area  - External Referral To Dermatology    On this date 3/25/2025 I have spent 23 minutes reviewing previous notes, test results and face to face with the patient discussing the diagnosis and importance of compliance with the treatment plan as well as documenting on the day of the visit.    Return if symptoms worsen or fail to improve.       Lab Results   Component Value Date    WBC 5.7 2024    HGB 15.6 2024    HCT 45.7 2024    MCV 91.6 2024     2024       Lab Results   Component Value Date    LABA1C 5.6 2024       Lab Results   Component Value Date     (L) 2024    K 5.0 2024    CL 99 2024    CO2 25 2024    BUN 20 2024    CREATININE 0.8 2024    GLUCOSE 85 2024    CALCIUM 9.2 2024    BILITOT 0.8 2024    ALKPHOS 88 2024    AST 25 2024    ALT 29 2024    LABGLOM >60 2023    GFRAA >60 2021    AGRATIO 0.9 (L) 2024    GLOB 5.3 (H) 2024           Subjective   SUBJECTIVE/OBJECTIVE:    HISTORY OF PRESENT ILLNESS:      History of Present Illness    This 43 yo male who presents with a rash under his left eye. He states he had a stye on the L eye a few weeks ago. He got OTC stye medication to use. He states the rash developed at the same time under the L eye.. He had some antifungal cream that he used on the area for one week,  and it did not work. There is a slight 'sensation' to the area. No real

## 2025-03-27 DIAGNOSIS — L30.9 DERMATITIS: Primary | ICD-10-CM

## 2025-04-25 ENCOUNTER — OFFICE VISIT (OUTPATIENT)
Dept: INTERNAL MEDICINE CLINIC | Age: 45
End: 2025-04-25

## 2025-04-25 ENCOUNTER — PATIENT MESSAGE (OUTPATIENT)
Dept: INTERNAL MEDICINE CLINIC | Age: 45
End: 2025-04-25

## 2025-04-25 VITALS
WEIGHT: 270 LBS | DIASTOLIC BLOOD PRESSURE: 74 MMHG | BODY MASS INDEX: 37.66 KG/M2 | OXYGEN SATURATION: 95 % | HEART RATE: 78 BPM | SYSTOLIC BLOOD PRESSURE: 118 MMHG

## 2025-04-25 DIAGNOSIS — D22.39 FIBROUS PAPULE OF NOSE: ICD-10-CM

## 2025-04-25 DIAGNOSIS — R76.8 INCREASED IMMUNOGLOBULIN: Primary | ICD-10-CM

## 2025-04-25 DIAGNOSIS — R76.8 ELEVATED SERUM IMMUNOGLOBULIN FREE LIGHT CHAINS: ICD-10-CM

## 2025-04-25 ASSESSMENT — ENCOUNTER SYMPTOMS
SHORTNESS OF BREATH: 0
COUGH: 0
NAUSEA: 0
ABDOMINAL PAIN: 0

## 2025-04-25 NOTE — PROGRESS NOTES
Lab Results   Component Value Date    TSH 2.030 07/02/2022     Lab Results   Component Value Date/Time    COLORU YELLOW 06/26/2021 08:15 AM    NITRU NEGATIVE 06/26/2021 08:15 AM    NITRU Negative 09/20/2019 01:31 PM    GLUCOSEU NEGATIVE 06/26/2021 08:15 AM    GLUCOSEU Negative 09/20/2019 01:31 PM    KETUA NEGATIVE 06/26/2021 08:15 AM    UROBILINOGEN NEGATIVE 06/26/2021 08:15 AM    BILIRUBINUR NEGATIVE 06/26/2021 08:15 AM       Subjective   SUBJECTIVE/OBJECTIVE:    HISTORY OF PRESENT ILLNESS:      History of Present Illness  The patient is a 44-year-old male who presents today for a follow-up of increased immunoglobulins and free light chains. He had an extensive workup by hematology/ oncology, but no cause was found. He was advised to follow up at his primary care office for lab testing every 6 months, with the understanding that if the numbers increased, he would return to hematology oncology. He has seen rheumatology for his mixed connective tissue disease and was told he was stable. It is suspected that his symptoms could have been related to COVID-19. He has continued to improve without any intervention or symptoms.   He was seen by dermatology and was informed that he had a fibrous papule of the nose, and no surgery will be performed at this time as it can reoccur. Previous dermatitis on the left lower eyelid has resolved.        Review of Systems   Constitutional:  Negative for diaphoresis and fever.   Respiratory:  Negative for cough and shortness of breath.    Cardiovascular:  Negative for chest pain and palpitations.   Gastrointestinal:  Negative for abdominal pain and nausea.   Genitourinary:  Negative for difficulty urinating.   Neurological:  Negative for dizziness and headaches.           Patient Active Problem List    Diagnosis Date Noted    Prediabetes 05/27/2022    Dyslipidemia 05/27/2022    Elevated total protein 06/08/2024    Increased immunoglobulin 06/08/2024    Sjogren syndrome with

## 2025-07-22 ENCOUNTER — TELEPHONE (OUTPATIENT)
Age: 45
End: 2025-07-22

## 2025-08-01 LAB
A/G RATIO: NORMAL (ref 1–3.3)
A/G RATIO: NORMAL RATIO (ref 0.8–2.6)
ALBUMIN ELP: NORMAL G/DL (ref 3.5–5.2)
ALBUMIN: NORMAL G/DL (ref 3.5–5.2)
ALP BLD-CCNC: NORMAL U/L (ref 23–144)
ALPHA 2: NORMAL G/DL (ref 0.5–1.1)
ALPHA-1-GLOBULIN: NORMAL G/DL (ref 0.2–0.5)
ALT SERPL-CCNC: NORMAL U/L (ref 0–60)
AST SERPL-CCNC: NORMAL U/L (ref 0–55)
BETA GLOBULIN: NORMAL G/DL (ref 0.6–1.1)
BILIRUB SERPL-MCNC: NORMAL MG/DL (ref 0–1.2)
BUN / CREAT RATIO: NORMAL (ref 7–25)
BUN BLDV-MCNC: NORMAL MG/DL (ref 3–29)
CALCIUM SERPL-MCNC: NORMAL MG/DL (ref 8.5–10.5)
CHLORIDE BLD-SCNC: NORMAL MEQ/L (ref 96–110)
CO2: NORMAL MEQ/L (ref 19–32)
CREAT SERPL-MCNC: NORMAL MG/DL (ref 0.5–1.2)
ESTIMATED GLOMERULAR FILTRATION RATE CREATININE EQUATION: NORMAL MLS/MIN/1.73M2
FASTING STATUS: NORMAL
GAMMA GLOBULIN: NORMAL G/DL (ref 0.5–1.5)
GLOBULIN: NORMAL G/DL (ref 1.9–3.6)
GLUCOSE BLD-MCNC: NORMAL MG/DL (ref 70–99)
KAPPA FREE LIGHT CHAINS QNT: NORMAL
KAPPA/LAMBDA FREE LIGHT CHAIN RATIO: NORMAL
LAMBDA: NORMAL
POTASSIUM SERPL-SCNC: NORMAL MEQ/L (ref 3.4–5.3)
PROTEIN PATTERN: NORMAL
SODIUM BLD-SCNC: NORMAL MEQ/L (ref 135–148)
TOTAL PROTEIN: NORMAL G/DL (ref 6–8.3)
TOTAL PROTEIN: NORMAL G/DL (ref 6–8.3)

## 2025-08-02 LAB
A/G RATIO: 1.2 RATIO (ref 0.8–2.6)
ALBUMIN: 4.3 G/DL (ref 3.5–5.2)
ALP BLD-CCNC: 75 U/L (ref 23–144)
ALT SERPL-CCNC: 28 U/L (ref 0–60)
AST SERPL-CCNC: 22 U/L (ref 0–55)
BILIRUB SERPL-MCNC: 0.6 MG/DL (ref 0–1.2)
BUN / CREAT RATIO: 19 (ref 7–25)
BUN BLDV-MCNC: 15 MG/DL (ref 3–29)
CALCIUM SERPL-MCNC: 9.5 MG/DL (ref 8.5–10.5)
CHLORIDE BLD-SCNC: 100 MEQ/L (ref 96–110)
CO2: 24 MEQ/L (ref 19–32)
CREAT SERPL-MCNC: 0.8 MG/DL (ref 0.5–1.2)
ESTIMATED GLOMERULAR FILTRATION RATE CREATININE EQUATION: 111 MLS/MIN/1.73M2
FASTING STATUS: NORMAL
GLOBULIN: 3.6 G/DL (ref 1.9–3.6)
GLUCOSE BLD-MCNC: 84 MG/DL (ref 70–99)
POTASSIUM SERPL-SCNC: 4.8 MEQ/L (ref 3.4–5.3)
SODIUM BLD-SCNC: 135 MEQ/L (ref 135–148)
TOTAL PROTEIN: 7.9 G/DL (ref 6–8.3)

## 2025-08-04 ENCOUNTER — OFFICE VISIT (OUTPATIENT)
Dept: INTERNAL MEDICINE CLINIC | Age: 45
End: 2025-08-04
Payer: COMMERCIAL

## 2025-08-04 VITALS
DIASTOLIC BLOOD PRESSURE: 80 MMHG | WEIGHT: 272 LBS | BODY MASS INDEX: 37.94 KG/M2 | SYSTOLIC BLOOD PRESSURE: 118 MMHG | OXYGEN SATURATION: 98 % | HEART RATE: 90 BPM

## 2025-08-04 DIAGNOSIS — T25.222D: Primary | ICD-10-CM

## 2025-08-04 DIAGNOSIS — M79.672 LEFT FOOT PAIN: ICD-10-CM

## 2025-08-04 LAB — KAPPA FREE LIGHT CHAINS QNT: 28 MG/L (ref 3.3–19.4)

## 2025-08-04 PROCEDURE — 99213 OFFICE O/P EST LOW 20 MIN: CPT | Performed by: FAMILY MEDICINE

## 2025-08-04 RX ORDER — DOXYCYCLINE HYCLATE 100 MG
100 TABLET ORAL 2 TIMES DAILY
Qty: 14 TABLET | Refills: 0 | Status: SHIPPED | OUTPATIENT
Start: 2025-08-04 | End: 2025-08-11

## 2025-08-04 RX ORDER — HYDROCODONE BITARTRATE AND ACETAMINOPHEN 5; 325 MG/1; MG/1
1 TABLET ORAL EVERY 8 HOURS PRN
Qty: 10 TABLET | Refills: 0 | Status: SHIPPED | OUTPATIENT
Start: 2025-08-04 | End: 2025-08-08

## 2025-08-04 ASSESSMENT — ENCOUNTER SYMPTOMS
COUGH: 0
SHORTNESS OF BREATH: 0
ABDOMINAL PAIN: 0
NAUSEA: 0

## 2025-08-05 LAB
A/G RATIO: 1.2 (ref 1–3.3)
ALBUMIN ELP: 4.3 G/DL (ref 3.5–5.2)
ALPHA 2: 0.6 G/DL (ref 0.5–1.1)
ALPHA-1-GLOBULIN: 0.3 G/DL (ref 0.2–0.5)
BETA GLOBULIN: 0.9 G/DL (ref 0.6–1.1)
GAMMA GLOBULIN: 1.8 G/DL (ref 0.5–1.5)
PROTEIN PATTERN: ABNORMAL
TOTAL PROTEIN: 8.1 G/DL (ref 6–8.3)

## 2025-08-06 ENCOUNTER — HOSPITAL ENCOUNTER (OUTPATIENT)
Dept: WOUND CARE | Age: 45
Discharge: HOME | End: 2025-08-06
Attending: NURSE PRACTITIONER
Payer: COMMERCIAL

## 2025-08-06 VITALS — SYSTOLIC BLOOD PRESSURE: 114 MMHG | HEART RATE: 83 BPM | TEMPERATURE: 97.6 F | DIASTOLIC BLOOD PRESSURE: 83 MMHG

## 2025-08-06 DIAGNOSIS — T25.222D PARTIAL THICKNESS BURN OF LEFT FOOT, SUBSEQUENT ENCOUNTER: Primary | ICD-10-CM

## 2025-08-06 PROBLEM — T25.222A SECOND DEGREE BURN OF LEFT FOOT: Status: ACTIVE | Noted: 2025-08-06

## 2025-08-06 PROCEDURE — 16020 DRESS/DEBRID P-THICK BURN S: CPT | Performed by: NURSE PRACTITIONER

## 2025-08-06 PROCEDURE — 99213 OFFICE O/P EST LOW 20 MIN: CPT

## 2025-08-06 PROCEDURE — 16020 DRESS/DEBRID P-THICK BURN S: CPT

## 2025-08-06 PROCEDURE — 99203 OFFICE O/P NEW LOW 30 MIN: CPT | Performed by: NURSE PRACTITIONER

## 2025-08-06 RX ORDER — BETAMETHASONE DIPROPIONATE 0.5 MG/G
CREAM TOPICAL PRN
OUTPATIENT
Start: 2025-08-06

## 2025-08-06 RX ORDER — LIDOCAINE HYDROCHLORIDE 20 MG/ML
JELLY TOPICAL PRN
OUTPATIENT
Start: 2025-08-06

## 2025-08-06 RX ORDER — GINSENG 100 MG
CAPSULE ORAL PRN
OUTPATIENT
Start: 2025-08-06

## 2025-08-06 RX ORDER — NEOMYCIN/BACITRACIN/POLYMYXINB 3.5-400-5K
OINTMENT (GRAM) TOPICAL PRN
OUTPATIENT
Start: 2025-08-06

## 2025-08-06 RX ORDER — SODIUM CHLOR/HYPOCHLOROUS ACID 0.033 %
SOLUTION, IRRIGATION IRRIGATION PRN
OUTPATIENT
Start: 2025-08-06

## 2025-08-06 RX ORDER — LIDOCAINE 50 MG/G
OINTMENT TOPICAL PRN
OUTPATIENT
Start: 2025-08-06

## 2025-08-06 RX ORDER — GENTAMICIN SULFATE 1 MG/G
OINTMENT TOPICAL PRN
OUTPATIENT
Start: 2025-08-06

## 2025-08-06 RX ORDER — CLOBETASOL PROPIONATE 0.5 MG/G
OINTMENT TOPICAL PRN
OUTPATIENT
Start: 2025-08-06

## 2025-08-06 RX ORDER — MUPIROCIN 2 %
OINTMENT (GRAM) TOPICAL PRN
OUTPATIENT
Start: 2025-08-06

## 2025-08-06 RX ORDER — TRIAMCINOLONE ACETONIDE 1 MG/G
OINTMENT TOPICAL PRN
OUTPATIENT
Start: 2025-08-06

## 2025-08-06 RX ORDER — SILVER SULFADIAZINE 10 MG/G
CREAM TOPICAL PRN
OUTPATIENT
Start: 2025-08-06

## 2025-08-06 RX ORDER — LIDOCAINE 40 MG/G
CREAM TOPICAL PRN
OUTPATIENT
Start: 2025-08-06

## 2025-08-06 RX ORDER — LIDOCAINE HYDROCHLORIDE 40 MG/ML
SOLUTION TOPICAL PRN
OUTPATIENT
Start: 2025-08-06

## 2025-08-06 RX ORDER — MUPIROCIN 2 %
OINTMENT (GRAM) TOPICAL 3 TIMES DAILY
COMMUNITY

## 2025-08-06 RX ORDER — SILVER SULFADIAZINE 10 MG/G
CREAM TOPICAL
Qty: 50 G | Refills: 0 | Status: SHIPPED | OUTPATIENT
Start: 2025-08-06

## 2025-08-06 RX ORDER — BACITRACIN ZINC AND POLYMYXIN B SULFATE 500; 1000 [USP'U]/G; [USP'U]/G
OINTMENT TOPICAL PRN
OUTPATIENT
Start: 2025-08-06

## 2025-08-06 ASSESSMENT — PAIN DESCRIPTION - FREQUENCY: FREQUENCY: INTERMITTENT

## 2025-08-06 ASSESSMENT — PAIN DESCRIPTION - LOCATION: LOCATION: FOOT

## 2025-08-06 ASSESSMENT — PAIN - FUNCTIONAL ASSESSMENT: PAIN_FUNCTIONAL_ASSESSMENT: PREVENTS OR INTERFERES SOME ACTIVE ACTIVITIES AND ADLS

## 2025-08-06 ASSESSMENT — PAIN SCALES - GENERAL: PAINLEVEL_OUTOF10: 5

## 2025-08-06 ASSESSMENT — PAIN DESCRIPTION - PAIN TYPE: TYPE: ACUTE PAIN

## 2025-08-06 ASSESSMENT — PAIN DESCRIPTION - DESCRIPTORS: DESCRIPTORS: BURNING;ACHING

## 2025-08-06 ASSESSMENT — PAIN DESCRIPTION - ORIENTATION: ORIENTATION: LEFT

## 2025-08-13 ENCOUNTER — HOSPITAL ENCOUNTER (OUTPATIENT)
Dept: WOUND CARE | Age: 45
Discharge: HOME | End: 2025-08-13
Attending: NURSE PRACTITIONER
Payer: COMMERCIAL

## 2025-08-13 VITALS — HEART RATE: 86 BPM | SYSTOLIC BLOOD PRESSURE: 127 MMHG | TEMPERATURE: 98.5 F | DIASTOLIC BLOOD PRESSURE: 87 MMHG

## 2025-08-13 DIAGNOSIS — T25.222D PARTIAL THICKNESS BURN OF LEFT FOOT, SUBSEQUENT ENCOUNTER: Primary | ICD-10-CM

## 2025-08-13 PROCEDURE — 16020 DRESS/DEBRID P-THICK BURN S: CPT

## 2025-08-13 PROCEDURE — 16020 DRESS/DEBRID P-THICK BURN S: CPT | Performed by: NURSE PRACTITIONER

## 2025-08-13 RX ORDER — LIDOCAINE HYDROCHLORIDE 20 MG/ML
JELLY TOPICAL PRN
OUTPATIENT
Start: 2025-08-13

## 2025-08-13 RX ORDER — SILVER SULFADIAZINE 10 MG/G
CREAM TOPICAL PRN
Status: DISCONTINUED | OUTPATIENT
Start: 2025-08-13 | End: 2025-08-14 | Stop reason: HOSPADM

## 2025-08-13 RX ORDER — GINSENG 100 MG
CAPSULE ORAL PRN
OUTPATIENT
Start: 2025-08-13

## 2025-08-13 RX ORDER — BETAMETHASONE DIPROPIONATE 0.5 MG/G
CREAM TOPICAL PRN
OUTPATIENT
Start: 2025-08-13

## 2025-08-13 RX ORDER — LIDOCAINE 50 MG/G
OINTMENT TOPICAL PRN
OUTPATIENT
Start: 2025-08-13

## 2025-08-13 RX ORDER — SODIUM CHLOR/HYPOCHLOROUS ACID 0.033 %
SOLUTION, IRRIGATION IRRIGATION PRN
OUTPATIENT
Start: 2025-08-13

## 2025-08-13 RX ORDER — TRIAMCINOLONE ACETONIDE 1 MG/G
OINTMENT TOPICAL PRN
OUTPATIENT
Start: 2025-08-13

## 2025-08-13 RX ORDER — LIDOCAINE 40 MG/G
CREAM TOPICAL PRN
OUTPATIENT
Start: 2025-08-13

## 2025-08-13 RX ORDER — CLOBETASOL PROPIONATE 0.5 MG/G
OINTMENT TOPICAL PRN
OUTPATIENT
Start: 2025-08-13

## 2025-08-13 RX ORDER — SILVER SULFADIAZINE 10 MG/G
CREAM TOPICAL PRN
OUTPATIENT
Start: 2025-08-13

## 2025-08-13 RX ORDER — MUPIROCIN 2 %
OINTMENT (GRAM) TOPICAL PRN
OUTPATIENT
Start: 2025-08-13

## 2025-08-13 RX ORDER — NEOMYCIN/BACITRACIN/POLYMYXINB 3.5-400-5K
OINTMENT (GRAM) TOPICAL PRN
OUTPATIENT
Start: 2025-08-13

## 2025-08-13 RX ORDER — GENTAMICIN SULFATE 1 MG/G
OINTMENT TOPICAL PRN
OUTPATIENT
Start: 2025-08-13

## 2025-08-13 RX ORDER — LIDOCAINE HYDROCHLORIDE 40 MG/ML
SOLUTION TOPICAL PRN
OUTPATIENT
Start: 2025-08-13

## 2025-08-13 RX ORDER — BACITRACIN ZINC AND POLYMYXIN B SULFATE 500; 1000 [USP'U]/G; [USP'U]/G
OINTMENT TOPICAL PRN
OUTPATIENT
Start: 2025-08-13

## 2025-08-13 ASSESSMENT — PAIN - FUNCTIONAL ASSESSMENT: PAIN_FUNCTIONAL_ASSESSMENT: ACTIVITIES ARE NOT PREVENTED

## 2025-08-13 ASSESSMENT — PAIN SCALES - GENERAL: PAINLEVEL_OUTOF10: 1

## 2025-08-13 ASSESSMENT — PAIN DESCRIPTION - ORIENTATION: ORIENTATION: LEFT

## 2025-08-13 ASSESSMENT — PAIN DESCRIPTION - PAIN TYPE: TYPE: ACUTE PAIN

## 2025-08-13 ASSESSMENT — PAIN DESCRIPTION - FREQUENCY: FREQUENCY: INTERMITTENT

## 2025-08-13 ASSESSMENT — PAIN DESCRIPTION - LOCATION: LOCATION: FOOT

## 2025-08-13 ASSESSMENT — PAIN DESCRIPTION - DESCRIPTORS: DESCRIPTORS: TENDER

## 2025-08-20 ENCOUNTER — HOSPITAL ENCOUNTER (OUTPATIENT)
Dept: WOUND CARE | Age: 45
Discharge: HOME | End: 2025-08-20
Attending: NURSE PRACTITIONER
Payer: COMMERCIAL

## 2025-08-20 VITALS
SYSTOLIC BLOOD PRESSURE: 118 MMHG | RESPIRATION RATE: 18 BRPM | TEMPERATURE: 97.8 F | HEART RATE: 82 BPM | DIASTOLIC BLOOD PRESSURE: 72 MMHG

## 2025-08-20 DIAGNOSIS — T25.222D PARTIAL THICKNESS BURN OF LEFT FOOT, SUBSEQUENT ENCOUNTER: Primary | ICD-10-CM

## 2025-08-20 PROCEDURE — 16020 DRESS/DEBRID P-THICK BURN S: CPT | Performed by: NURSE PRACTITIONER

## 2025-08-20 PROCEDURE — 16020 DRESS/DEBRID P-THICK BURN S: CPT

## 2025-08-20 RX ORDER — LIDOCAINE 50 MG/G
OINTMENT TOPICAL PRN
OUTPATIENT
Start: 2025-08-20

## 2025-08-20 RX ORDER — BETAMETHASONE DIPROPIONATE 0.5 MG/G
CREAM TOPICAL PRN
OUTPATIENT
Start: 2025-08-20

## 2025-08-20 RX ORDER — NEOMYCIN/BACITRACIN/POLYMYXINB 3.5-400-5K
OINTMENT (GRAM) TOPICAL PRN
OUTPATIENT
Start: 2025-08-20

## 2025-08-20 RX ORDER — SILVER SULFADIAZINE 10 MG/G
CREAM TOPICAL PRN
Status: DISCONTINUED | OUTPATIENT
Start: 2025-08-20 | End: 2025-08-21 | Stop reason: HOSPADM

## 2025-08-20 RX ORDER — SILVER SULFADIAZINE 10 MG/G
CREAM TOPICAL PRN
OUTPATIENT
Start: 2025-08-20

## 2025-08-20 RX ORDER — BACITRACIN ZINC AND POLYMYXIN B SULFATE 500; 1000 [USP'U]/G; [USP'U]/G
OINTMENT TOPICAL PRN
OUTPATIENT
Start: 2025-08-20

## 2025-08-20 RX ORDER — GINSENG 100 MG
CAPSULE ORAL PRN
OUTPATIENT
Start: 2025-08-20

## 2025-08-20 RX ORDER — CLOBETASOL PROPIONATE 0.5 MG/G
OINTMENT TOPICAL PRN
Status: DISCONTINUED | OUTPATIENT
Start: 2025-08-20 | End: 2025-08-21 | Stop reason: HOSPADM

## 2025-08-20 RX ORDER — LIDOCAINE 40 MG/G
CREAM TOPICAL PRN
OUTPATIENT
Start: 2025-08-20

## 2025-08-20 RX ORDER — MUPIROCIN 2 %
OINTMENT (GRAM) TOPICAL PRN
OUTPATIENT
Start: 2025-08-20

## 2025-08-20 RX ORDER — LIDOCAINE HYDROCHLORIDE 40 MG/ML
SOLUTION TOPICAL PRN
OUTPATIENT
Start: 2025-08-20

## 2025-08-20 RX ORDER — TRIAMCINOLONE ACETONIDE 1 MG/G
OINTMENT TOPICAL PRN
OUTPATIENT
Start: 2025-08-20

## 2025-08-20 RX ORDER — SODIUM CHLOR/HYPOCHLOROUS ACID 0.033 %
SOLUTION, IRRIGATION IRRIGATION PRN
OUTPATIENT
Start: 2025-08-20

## 2025-08-20 RX ORDER — GENTAMICIN SULFATE 1 MG/G
OINTMENT TOPICAL PRN
OUTPATIENT
Start: 2025-08-20

## 2025-08-20 RX ORDER — LIDOCAINE HYDROCHLORIDE 20 MG/ML
JELLY TOPICAL PRN
OUTPATIENT
Start: 2025-08-20

## 2025-08-20 RX ORDER — CLOBETASOL PROPIONATE 0.5 MG/G
OINTMENT TOPICAL PRN
OUTPATIENT
Start: 2025-08-20

## 2025-08-27 ENCOUNTER — HOSPITAL ENCOUNTER (OUTPATIENT)
Dept: WOUND CARE | Age: 45
Discharge: HOME | End: 2025-08-27
Attending: NURSE PRACTITIONER
Payer: COMMERCIAL

## 2025-08-27 VITALS — TEMPERATURE: 97.4 F | DIASTOLIC BLOOD PRESSURE: 83 MMHG | SYSTOLIC BLOOD PRESSURE: 128 MMHG | HEART RATE: 75 BPM

## 2025-08-27 DIAGNOSIS — T25.222D PARTIAL THICKNESS BURN OF LEFT FOOT, SUBSEQUENT ENCOUNTER: Primary | ICD-10-CM

## 2025-08-27 PROCEDURE — 97597 DBRDMT OPN WND 1ST 20 CM/<: CPT | Performed by: NURSE PRACTITIONER

## 2025-08-27 PROCEDURE — 97597 DBRDMT OPN WND 1ST 20 CM/<: CPT

## 2025-08-27 RX ORDER — GENTAMICIN SULFATE 1 MG/G
OINTMENT TOPICAL PRN
Status: CANCELLED | OUTPATIENT
Start: 2025-08-27

## 2025-08-27 RX ORDER — SILVER SULFADIAZINE 10 MG/G
CREAM TOPICAL PRN
Status: CANCELLED | OUTPATIENT
Start: 2025-08-27

## 2025-08-27 RX ORDER — SODIUM CHLOR/HYPOCHLOROUS ACID 0.033 %
SOLUTION, IRRIGATION IRRIGATION PRN
Status: CANCELLED | OUTPATIENT
Start: 2025-08-27

## 2025-08-27 RX ORDER — MUPIROCIN 2 %
OINTMENT (GRAM) TOPICAL PRN
Status: CANCELLED | OUTPATIENT
Start: 2025-08-27

## 2025-08-27 RX ORDER — LIDOCAINE HYDROCHLORIDE 40 MG/ML
SOLUTION TOPICAL PRN
Status: CANCELLED | OUTPATIENT
Start: 2025-08-27

## 2025-08-27 RX ORDER — TRIAMCINOLONE ACETONIDE 1 MG/G
OINTMENT TOPICAL PRN
Status: CANCELLED | OUTPATIENT
Start: 2025-08-27

## 2025-08-27 RX ORDER — LIDOCAINE 40 MG/G
CREAM TOPICAL PRN
Status: CANCELLED | OUTPATIENT
Start: 2025-08-27

## 2025-08-27 RX ORDER — BACITRACIN ZINC AND POLYMYXIN B SULFATE 500; 1000 [USP'U]/G; [USP'U]/G
OINTMENT TOPICAL PRN
Status: CANCELLED | OUTPATIENT
Start: 2025-08-27

## 2025-08-27 RX ORDER — LIDOCAINE HYDROCHLORIDE 20 MG/ML
JELLY TOPICAL PRN
Status: CANCELLED | OUTPATIENT
Start: 2025-08-27

## 2025-08-27 RX ORDER — GINSENG 100 MG
CAPSULE ORAL PRN
Status: CANCELLED | OUTPATIENT
Start: 2025-08-27

## 2025-08-27 RX ORDER — LIDOCAINE 50 MG/G
OINTMENT TOPICAL PRN
Status: CANCELLED | OUTPATIENT
Start: 2025-08-27

## 2025-08-27 RX ORDER — BETAMETHASONE DIPROPIONATE 0.5 MG/G
CREAM TOPICAL PRN
Status: CANCELLED | OUTPATIENT
Start: 2025-08-27

## 2025-08-27 RX ORDER — NEOMYCIN/BACITRACIN/POLYMYXINB 3.5-400-5K
OINTMENT (GRAM) TOPICAL PRN
Status: CANCELLED | OUTPATIENT
Start: 2025-08-27

## 2025-08-27 RX ORDER — CLOBETASOL PROPIONATE 0.5 MG/G
OINTMENT TOPICAL PRN
Status: CANCELLED | OUTPATIENT
Start: 2025-08-27

## 2025-08-27 ASSESSMENT — PAIN SCALES - GENERAL: PAINLEVEL_OUTOF10: 0
